# Patient Record
Sex: FEMALE | Race: BLACK OR AFRICAN AMERICAN | Employment: FULL TIME | URBAN - METROPOLITAN AREA
[De-identification: names, ages, dates, MRNs, and addresses within clinical notes are randomized per-mention and may not be internally consistent; named-entity substitution may affect disease eponyms.]

---

## 2018-08-23 PROBLEM — E66.01 OBESITY, MORBID (HCC): Status: ACTIVE | Noted: 2018-08-23

## 2018-09-24 PROBLEM — E55.9 VITAMIN D DEFICIENCY: Status: ACTIVE | Noted: 2018-09-24

## 2018-12-26 ENCOUNTER — HOSPITAL ENCOUNTER (OUTPATIENT)
Dept: GENERAL RADIOLOGY | Age: 28
Discharge: HOME | End: 2018-12-26
Payer: COMMERCIAL

## 2018-12-26 DIAGNOSIS — M41.124 ADOLESCENT IDIOPATHIC SCOLIOSIS OF THORACIC REGION: ICD-10-CM

## 2018-12-26 PROCEDURE — 72072 X-RAY EXAM THORAC SPINE 3VWS: CPT

## 2018-12-26 PROCEDURE — 73562 X-RAY EXAM OF KNEE 3: CPT

## 2018-12-27 PROBLEM — G89.29 CHRONIC MIDLINE LOW BACK PAIN WITH SCIATICA: Status: ACTIVE | Noted: 2018-12-27

## 2018-12-27 PROBLEM — M54.40 CHRONIC MIDLINE LOW BACK PAIN WITH SCIATICA: Status: ACTIVE | Noted: 2018-12-27

## 2020-02-25 ENCOUNTER — HOSPITAL ENCOUNTER (OUTPATIENT)
Dept: MRI IMAGING | Age: 30
Discharge: HOME | End: 2020-02-25
Attending: FAMILY MEDICINE
Payer: COMMERCIAL

## 2020-02-25 DIAGNOSIS — M25.561 CHRONIC PAIN OF RIGHT KNEE: ICD-10-CM

## 2020-02-25 DIAGNOSIS — G89.29 CHRONIC PAIN OF RIGHT KNEE: ICD-10-CM

## 2020-02-25 PROCEDURE — 73721 MRI JNT OF LWR EXTRE W/O DYE: CPT

## 2020-03-06 NOTE — PROGRESS NOTES
She has a partial-thickness tear of the lateral patellar facet with some delamination and edema. She needs to be referred to orthopedics.

## 2020-03-29 ENCOUNTER — APPOINTMENT (OUTPATIENT)
Dept: GENERAL RADIOLOGY | Age: 30
End: 2020-03-29
Attending: EMERGENCY MEDICINE
Payer: COMMERCIAL

## 2020-03-29 ENCOUNTER — HOSPITAL ENCOUNTER (EMERGENCY)
Age: 30
Discharge: HOME | End: 2020-03-30
Attending: EMERGENCY MEDICINE
Payer: COMMERCIAL

## 2020-03-29 VITALS
WEIGHT: 267 LBS | SYSTOLIC BLOOD PRESSURE: 132 MMHG | HEART RATE: 75 BPM | DIASTOLIC BLOOD PRESSURE: 81 MMHG | BODY MASS INDEX: 42.91 KG/M2 | TEMPERATURE: 98.7 F | RESPIRATION RATE: 17 BRPM | OXYGEN SATURATION: 100 % | HEIGHT: 66 IN

## 2020-03-29 DIAGNOSIS — R07.89 MUSCULOSKELETAL CHEST PAIN: Primary | ICD-10-CM

## 2020-03-29 LAB
ALBUMIN SERPL-MCNC: 3.8 G/DL (ref 3.5–5)
ALBUMIN/GLOB SERPL: 0.9 {RATIO} (ref 1.2–3.5)
ALP SERPL-CCNC: 62 U/L (ref 50–136)
ALT SERPL-CCNC: 14 U/L (ref 12–65)
ANION GAP SERPL CALC-SCNC: 8 MMOL/L (ref 7–16)
AST SERPL-CCNC: 10 U/L (ref 15–37)
BASOPHILS # BLD: 0 K/UL (ref 0–0.2)
BASOPHILS NFR BLD: 0 % (ref 0–2)
BILIRUB SERPL-MCNC: 0.3 MG/DL (ref 0.2–1.1)
BUN SERPL-MCNC: 13 MG/DL (ref 6–23)
CALCIUM SERPL-MCNC: 10 MG/DL (ref 8.3–10.4)
CHLORIDE SERPL-SCNC: 106 MMOL/L (ref 98–107)
CO2 SERPL-SCNC: 26 MMOL/L (ref 21–32)
CREAT SERPL-MCNC: 0.79 MG/DL (ref 0.6–1)
DIFFERENTIAL METHOD BLD: ABNORMAL
EOSINOPHIL # BLD: 0.5 K/UL (ref 0–0.8)
EOSINOPHIL NFR BLD: 4 % (ref 0.5–7.8)
ERYTHROCYTE [DISTWIDTH] IN BLOOD BY AUTOMATED COUNT: 16.4 % (ref 11.9–14.6)
GLOBULIN SER CALC-MCNC: 4.1 G/DL (ref 2.3–3.5)
GLUCOSE SERPL-MCNC: 90 MG/DL (ref 65–100)
HCT VFR BLD AUTO: 37.8 % (ref 35.8–46.3)
HGB BLD-MCNC: 12 G/DL (ref 11.7–15.4)
IMM GRANULOCYTES # BLD AUTO: 0 K/UL (ref 0–0.5)
IMM GRANULOCYTES NFR BLD AUTO: 0 % (ref 0–5)
LYMPHOCYTES # BLD: 4.4 K/UL (ref 0.5–4.6)
LYMPHOCYTES NFR BLD: 39 % (ref 13–44)
MCH RBC QN AUTO: 26.9 PG (ref 26.1–32.9)
MCHC RBC AUTO-ENTMCNC: 31.7 G/DL (ref 31.4–35)
MCV RBC AUTO: 84.8 FL (ref 79.6–97.8)
MONOCYTES # BLD: 1.1 K/UL (ref 0.1–1.3)
MONOCYTES NFR BLD: 10 % (ref 4–12)
NEUTS SEG # BLD: 5.1 K/UL (ref 1.7–8.2)
NEUTS SEG NFR BLD: 46 % (ref 43–78)
NRBC # BLD: 0 K/UL (ref 0–0.2)
PLATELET # BLD AUTO: 349 K/UL (ref 150–450)
PMV BLD AUTO: 10.5 FL (ref 9.4–12.3)
POTASSIUM SERPL-SCNC: 3.7 MMOL/L (ref 3.5–5.1)
PROT SERPL-MCNC: 7.9 G/DL (ref 6.3–8.2)
RBC # BLD AUTO: 4.46 M/UL (ref 4.05–5.2)
SODIUM SERPL-SCNC: 140 MMOL/L (ref 136–145)
TROPONIN I SERPL-MCNC: <0.02 NG/ML (ref 0.02–0.05)
WBC # BLD AUTO: 11.1 K/UL (ref 4.3–11.1)

## 2020-03-29 PROCEDURE — 80053 COMPREHEN METABOLIC PANEL: CPT

## 2020-03-29 PROCEDURE — 96374 THER/PROPH/DIAG INJ IV PUSH: CPT

## 2020-03-29 PROCEDURE — 93005 ELECTROCARDIOGRAM TRACING: CPT | Performed by: EMERGENCY MEDICINE

## 2020-03-29 PROCEDURE — 85025 COMPLETE CBC W/AUTO DIFF WBC: CPT

## 2020-03-29 PROCEDURE — 74011250636 HC RX REV CODE- 250/636: Performed by: EMERGENCY MEDICINE

## 2020-03-29 PROCEDURE — 84484 ASSAY OF TROPONIN QUANT: CPT

## 2020-03-29 PROCEDURE — 99284 EMERGENCY DEPT VISIT MOD MDM: CPT

## 2020-03-29 RX ORDER — KETOROLAC TROMETHAMINE 30 MG/ML
30 INJECTION, SOLUTION INTRAMUSCULAR; INTRAVENOUS
Status: COMPLETED | OUTPATIENT
Start: 2020-03-29 | End: 2020-03-29

## 2020-03-29 RX ORDER — MINERAL OIL
180 ENEMA (ML) RECTAL DAILY
Qty: 31 TAB | Refills: 3 | Status: SHIPPED | OUTPATIENT
Start: 2020-03-29

## 2020-03-29 RX ADMIN — KETOROLAC TROMETHAMINE 30 MG: 30 INJECTION, SOLUTION INTRAMUSCULAR at 23:53

## 2020-03-29 NOTE — LETTER
[facility] EMERGENCY DEPT 
[address] 
[phone] Work/School Note Date: 3/29/2020 To Whom It May concern: 
 
Verlyn Mortimer was seen and treated today in the emergency room by the following provider(s): 
No providers found. Verlyn Mortimer may return to work on 03/31/2020 Sincerely, Milly Bernal RN

## 2020-03-30 LAB
ATRIAL RATE: 83 BPM
CALCULATED P AXIS, ECG09: 38 DEGREES
CALCULATED R AXIS, ECG10: 27 DEGREES
CALCULATED T AXIS, ECG11: 48 DEGREES
DIAGNOSIS, 93000: NORMAL
P-R INTERVAL, ECG05: 146 MS
Q-T INTERVAL, ECG07: 342 MS
QRS DURATION, ECG06: 84 MS
QTC CALCULATION (BEZET), ECG08: 401 MS
VENTRICULAR RATE, ECG03: 83 BPM

## 2020-03-30 NOTE — ED PROVIDER NOTES
20-year-old female presents with 3 days of cough, sore throat, runny nose. Rhinorrhea is been mostly clear. Cough is been nonproductive. Today she started developing a sharp tightness to her anterior chest which is been constant all day long. This chest pain worsens with movement, palpation, and with deep breathing. No fevers or chills,   patient is a non-smoker and takes no birth control pills. No history of DVT or PE. No past medical history on file. No past surgical history on file.       Family History:   Problem Relation Age of Onset    Diabetes Maternal Aunt     Diabetes Maternal Grandmother        Social History     Socioeconomic History    Marital status: SINGLE     Spouse name: Not on file    Number of children: Not on file    Years of education: Not on file    Highest education level: Not on file   Occupational History    Not on file   Social Needs    Financial resource strain: Not on file    Food insecurity     Worry: Not on file     Inability: Not on file    Transportation needs     Medical: Not on file     Non-medical: Not on file   Tobacco Use    Smoking status: Never Smoker    Smokeless tobacco: Never Used   Substance and Sexual Activity    Alcohol use: Yes     Comment: Occasionally    Drug use: No    Sexual activity: Yes   Lifestyle    Physical activity     Days per week: Not on file     Minutes per session: Not on file    Stress: Not on file   Relationships    Social connections     Talks on phone: Not on file     Gets together: Not on file     Attends Sabianist service: Not on file     Active member of club or organization: Not on file     Attends meetings of clubs or organizations: Not on file     Relationship status: Not on file    Intimate partner violence     Fear of current or ex partner: Not on file     Emotionally abused: Not on file     Physically abused: Not on file     Forced sexual activity: Not on file   Other Topics Concern    Not on file Social History Narrative    Not on file         ALLERGIES: Patient has no known allergies. Review of Systems   Constitutional: Negative for chills and fever. HENT: Positive for congestion, postnasal drip, rhinorrhea and sore throat. Negative for facial swelling and trouble swallowing. Eyes: Negative for discharge and redness. Respiratory: Positive for cough. Negative for shortness of breath. Cardiovascular: Positive for chest pain. Negative for palpitations and leg swelling. Gastrointestinal: Negative for abdominal pain, diarrhea, nausea and vomiting. Skin: Negative for rash. Neurological: Negative for dizziness and headaches. All other systems reviewed and are negative. Vitals:    03/29/20 2253 03/29/20 2313   BP: 130/86 135/87   Pulse:  82   Resp: 18 17   Temp: 98.7 °F (37.1 °C)    SpO2: 100% 100%   Weight: 121.1 kg (267 lb)    Height: 5' 6\" (1.676 m)             Physical Exam  Vitals signs and nursing note reviewed. Constitutional:       General: She is not in acute distress. Appearance: Normal appearance. She is well-developed. She is not ill-appearing, toxic-appearing or diaphoretic. HENT:      Head: Normocephalic and atraumatic. Nose: Congestion present. No rhinorrhea. Mouth/Throat:      Mouth: Mucous membranes are moist.      Pharynx: Oropharynx is clear. No oropharyngeal exudate or posterior oropharyngeal erythema. Eyes:      General: No scleral icterus. Right eye: No discharge. Left eye: No discharge. Conjunctiva/sclera: Conjunctivae normal.      Pupils: Pupils are equal, round, and reactive to light. Neck:      Musculoskeletal: Normal range of motion and neck supple. Cardiovascular:      Rate and Rhythm: Normal rate and regular rhythm. Heart sounds: Normal heart sounds. No murmur. No gallop. Pulmonary:      Effort: Pulmonary effort is normal. No respiratory distress. Breath sounds: Normal breath sounds.  No wheezing or rales.   Chest:      Chest wall: Tenderness present. Musculoskeletal: Normal range of motion. Skin:     General: Skin is warm and dry. Neurological:      General: No focal deficit present. Mental Status: She is alert and oriented to person, place, and time. Mental status is at baseline. Motor: No abnormal muscle tone. Comments: cni 2-12 grossly   Psychiatric:         Mood and Affect: Mood normal.         Behavior: Behavior normal.          MDM  Number of Diagnoses or Management Options  Musculoskeletal chest pain:   Diagnosis management comments: Medical decision making note:  Seasonal allergies versus viral URI. Afebrile.,  Lungs are clear, sats are 100% so chest x-ray was canceled. Skeletal chest pain with a completely normal EKG. Toradol here, Allegra-D to go and maximize OTC regimen as per AVS  This concludes the \"medical decision making note\" part of this emergency department visit note.            Procedures

## 2020-03-30 NOTE — ED NOTES
I have reviewed discharge instructions with the patient. The patient verbalized understanding. Patient left ED via Discharge Method: ambulatory to Home via pov. Opportunity for questions and clarification provided. Patient given 1 scripts. To continue your aftercare when you leave the hospital, you may receive an automated call from our care team to check in on how you are doing. This is a free service and part of our promise to provide the best care and service to meet your aftercare needs.  If you have questions, or wish to unsubscribe from this service please call [phone]. Thank you for Choosing our [facility] Emergency Department.

## 2020-03-30 NOTE — ED TRIAGE NOTES
Pt arrives ambulatory to triage with c/o allergy like s/s that began Friday. Pt reports yesterday she started having a NP cough. Today she states shes had CP intermittently all day. Denies fevers/chills/body aches.

## 2020-03-30 NOTE — DISCHARGE INSTRUCTIONS
Use afrin nasal spray, one spray to each nostril, every 12 hours, TWICE a day, for THREE days only, (then set it aside for 1 week)  1,200mg mucinex DM every 12 hours for a week. Try using a netti-pot or a elier bottle to rinse out your sinuses  Switch from the [employer] decongestant to the allegra d  Drink plenty of fluids  For the chest wall pain : Alternate 4 ibuprofen every 8 hours with 2 extra-strength tylenol every 6 hours      Patient Education        Musculoskeletal Chest Pain: Care Instructions  Your Care Instructions    Chest pain is not always a sign that something is wrong with your heart or that you have another serious problem. The doctor thinks your chest pain is caused by strained muscles or ligaments, inflamed chest cartilage, or another problem in your chest, rather than by your heart. You may need more tests to find the cause of your chest pain. Follow-up care is a key part of your treatment and safety. Be sure to make and go to all appointments, and call your doctor if you are having problems. It's also a good idea to know your test results and keep a list of the medicines you take. How can you care for yourself at home? · Take pain medicines exactly as directed. ? If the doctor gave you a prescription medicine for pain, take it as prescribed. ? If you are not taking a prescription pain medicine, ask your doctor if you can take an over-the-counter medicine. · Rest and protect the sore area. · Stop, change, or take a break from any activity that may be causing your pain or soreness. · Put ice or a cold pack on the sore area for 10 to 20 minutes at a time. Try to do this every 1 to 2 hours for the next 3 days (when you are awake) or until the swelling goes down. Put a thin cloth between the ice and your skin. · After 2 or 3 days, apply a heating pad set on low or a warm cloth to the area that hurts. Some doctors suggest that you go back and forth between hot and cold.   · Do not wrap or tape your ribs for support. This may cause you to take smaller breaths, which could increase your risk of lung problems. · Mentholated creams such as Bengay or Icy Hot may soothe sore muscles. Follow the instructions on the package. · Follow your doctor's instructions for exercising. · Gentle stretching and massage may help you get better faster. Stretch slowly to the point just before pain begins, and hold the stretch for at least 15 to 30 seconds. Do this 3 or 4 times a day. Stretch just after you have applied heat. · As your pain gets better, slowly return to your normal activities. Any increased pain may be a sign that you need to rest a while longer. When should you call for help? Call 911 anytime you think you may need emergency care. For example, call if:    · You have chest pain or pressure. This may occur with:  ? Sweating. ? Shortness of breath. ? Nausea or vomiting. ? Pain that spreads from the chest to the neck, jaw, or one or both shoulders or arms. ? Dizziness or lightheadedness. ? A fast or uneven pulse. After calling  911, chew 1 adult-strength aspirin. Wait for an ambulance. Do not try to drive yourself.     · You have sudden chest pain and shortness of breath, or you cough up blood.    Call your doctor now or seek immediate medical care if:    · You have any trouble breathing.     · Your chest pain gets worse.     · Your chest pain occurs consistently with exercise and is relieved by rest.    Watch closely for changes in your health, and be sure to contact your doctor if:    · Your chest pain does not get better after 1 week. Where can you learn more? Go to http://clare-melida.info/  Enter V293 in the search box to learn more about \"Musculoskeletal Chest Pain: Care Instructions. \"  Current as of: June 26, 2019Content Version: 12.4  © 9994-5441 Healthwise, Incorporated.   Care instructions adapted under license by [employer] (which disclaims liability or warranty for this information). If you have questions about a medical condition or this instruction, always ask your healthcare professional. [address] any warranty or liability for your use of this information. Patient Education        Saline Nasal Washes: Care Instructions  Your Care Instructions  Saline nasal washes help keep the nasal passages open by washing out thick or dried mucus. This simple remedy can help relieve symptoms of allergies, sinusitis, and colds. It also can make the nose feel more comfortable by keeping the mucous membranes moist. You may notice a little burning sensation in your nose the first few times you use the solution, but this usually gets better in a few days. Follow-up care is a key part of your treatment and safety. Be sure to make and go to all appointments, and call your doctor if you are having problems. It's also a good idea to know your test results and keep a list of the medicines you take. How can you care for yourself at home? · You can buy premixed saline solution in a squeeze bottle or other sinus rinse products at a drugstore. Read and follow the instructions on the label. · You also can make your own saline solution by adding 1 teaspoon of salt and 1 teaspoon of baking soda to 2 cups of distilled water. · If you use a homemade solution, pour a small amount into a clean bowl. Using a rubber bulb syringe, squeeze the syringe and place the tip in the salt water. Pull a small amount of the salt water into the syringe by relaxing your hand. · Sit down with your head tilted slightly back. Do not lie down. Put the tip of the bulb syringe or the squeeze bottle a little way into one of your nostrils. Gently drip or squirt a few drops into the nostril. Repeat with the other nostril. Some sneezing and gagging are normal at first.  · Gently blow your nose. · Wipe the syringe or bottle tip clean after each use.   · Repeat this 2 or 3 times a day.  · Use nasal washes gently if you have nosebleeds often. When should you call for help? Watch closely for changes in your health, and be sure to contact your doctor if:    · You often get nosebleeds.     · You have problems doing the nasal washes. Where can you learn more? Go to http://clare-melida.info/  Enter B784 in the search box to learn more about \"Saline Nasal Washes: Care Instructions. \"  Current as of: July 28, 2019Content Version: 12.4  © 7551-3854 Healthwise, Incorporated. Care instructions adapted under license by [employer] (which disclaims liability or warranty for this information). If you have questions about a medical condition or this instruction, always ask your healthcare professional. [address] any warranty or liability for your use of this information.

## 2020-05-19 ENCOUNTER — HOSPITAL ENCOUNTER (OUTPATIENT)
Dept: PHYSICAL THERAPY | Age: 30
Discharge: HOME | End: 2020-05-19
Payer: COMMERCIAL

## 2020-05-19 PROCEDURE — 97162 PT EVAL MOD COMPLEX 30 MIN: CPT

## 2020-05-19 PROCEDURE — 97035 APP MDLTY 1+ULTRASOUND EA 15: CPT

## 2020-05-19 PROCEDURE — 97110 THERAPEUTIC EXERCISES: CPT

## 2020-05-19 NOTE — THERAPY EVALUATION
Maida Steven  : 1990  Primary: [facility]  Secondary:  [address] at [address], [address] [place] [address] Street  [phone]   [phone]       OUTPATIENT PHYSICAL THERAPY:Initial Assessment 2020    ICD-10: Treatment Diagnosis: M25.561 pain in right knee                 Treatment Diagnosis 2: R26.89 other abnormalities of gait and mobility                 Treatment Diagnosis 3:  Precautions: squatting and excessive standing on hard surfaces  Allergies: Patient has no known allergies. TREATMENT PLAN:  Effective Dates: 2020 TO 2020 (60 days). Frequency/Duration: 2 times a week for 60 Day(s) MEDICAL/REFERRING DIAGNOSIS:  Pain in right knee [M25.561]   DATE OF ONSET:on an doff knee pain for 13 years -worse over the last 7 years   REFERRING PHYSICIAN: Rosalinda Cruz MD MD Orders: Evaluate and Treat  Return MD Appointment: unsure     INITIAL ASSESSMENT:  Ms. Olayinka Poon is a 35 y.o. female presenting to physical therapy with complaints of chronic R knee pain since she was 13years old -knee pain improved but aggravated 7 years ago and has had knee pain ever since -knee pain is 5/10 at rest and as high as 8/10 with standing at her job as a  -knee range is wfl -no swelling noted -hyper mobile R patella -weak R quads and hamstrings -ambulates independently with minimal antalgic gait-noted genu valgus -  -very good candidate for skilled physical therapy to include manual therapy, therapeutic exercises, pain modalities as needed and use of taping and hot/cold modalities  -will push strengthening after taping    PROBLEM LIST (Impacting functional limitations):  1. Decreased Strength  2. Decreased ADL/Functional Activities  3. Decreased Transfer Abilities  4. Decreased Ambulation Ability/Technique  5. Decreased Balance  6. Decreased Activity Tolerance  7.  Decreased Pacing Skills INTERVENTIONS PLANNED: (Treatment may consist of any combination of the following)  1. Cold  2. Gait Training  3. Heat  4. Home Exercise Program (HEP)  5. Manual Therapy  6. Therapeutic Exercise/Strengthening  7. Ultrasound (US)     GOALS: (Goals have been discussed and agreed upon with patient.)  Short-Term Functional Goals: Time Frame: 5/19/2020 to 6/2/2020  1. Patient demonstrates independence with home exercise program without verbal cueing provided by therapist.  2. Knee pain is less than 5/10 at rest and less than 8/10 with standing to progress to DC goal   3. Knee strength in quads is increased by 1/2 grade   4. Independent ambulation with minimal to no antalgic gait   5. Able to stand /walk for at least 90 minutes with no knee pain   Discharge Goals: Time Frame: 5/19/2020 to 7/18/2020  1. Minimal to no knee pain at rest and 2-3/10 after working full day as    2. Knee strength is at least 3+ to 4-/5 to allow increased ADLs   3. Independent ambulation with jazmin antalgic gait to allow walking community distances   4. Consistent use of ice/knee brace while at her work [employer] breaks   5. Minimal issues getting in and out of car/bed   6. LEFS score is improved from 55/80 to 68/80    Outcome Measure: Tool Used: Lower Extremity Functional Scale (LEFS)  Score:  Initial: 55/80 Most Recent: X/80 (Date: -- )   Interpretation of Score: 20 questions each scored on a 5 point scale with 0 representing \"extreme difficulty or unable to perform\" and 4 representing \"no difficulty\". The lower the score, the greater the functional disability. 80/80 represents no disability. Minimal detectable change is 9 points. Medical Necessity:   · Patient is expected to demonstrate progress in strength, balance, coordination and functional technique to increase independence with ADLs and improve safety during walking and transfers. · Skilled intervention continues to be required due to R knee pain and weakness is affecting function and gait .   Reason for Services/Other Comments:  · Patient continues to require modification of therapeutic interventions to increase complexity of exercises. Total Evaluation Duration: 20 minutes    Rehabilitation Potential For Stated Goals: Good  Regarding [address] [place] [address] therapy, I certify that the treatment plan above will be carried out by a therapist or under their direction. Thank you for this referral,  Ross Durand PT     Referring Physician Signature: Katrina Kawasaki, MD              Date                     PAIN/SUBJECTIVE:    Initial: Pain Intensity 1: 5  Pain Location 1: Knee  Pain Orientation 1: Right, Medial, Mid  Pain Intervention(s) 1: Rest, Heat applied, Cold pack Post Session:  3/10 -relief with taping and cold pack -performed exercises well     HISTORY:    History of Injury/Illness (Reason for Referral):  Chronic R knee pain since injuring knee when she was 13years old -reinjured at 26years old and pain ever since   Past Medical History/Comorbidities:   Ms. Padmini Maravilla  has no past medical history on file. Ms. Padmini Maravilla  has no past surgical history on file. Social History/Living Environment:   Home Environment: Private residence  # Steps to Enter: 8  Rails to Enter: Yes  Wheelchair Ramp: No  One/Two Story Residence: One story  Living Alone: No  Support Systems: Family member(s)  Patient Expects to be Discharged to[de-identified] Private residence  Current DME Used/Available at Home: None  Tub or Shower Type: Tub/Shower combination  Prior Level of Function/Work/Activity:  Independent with home ADLs and job duties     Ambulatory/Rehab Services H2 Model Falls Risk Assessment    Risk Factors:       No Risk Factors Identified Ability to Rise from Chair:       (1)  Pushes up, successful in one attempt    Falls Prevention Plan:       No modifications necessary    Total: (5 or greater = High Risk): 1    ©2010 [facility]. [facility] Patent #[id number].  Federal Law prohibits the replication, distribution or use without written permission from [facility]    Current Medications:        Current Outpatient Medications:     fexofenadine (Allegra Allergy) 180 mg tablet, Take 1 Tab by mouth daily. , Disp: 31 Tab, Rfl: 3    azithromycin (ZITHROMAX) 250 mg tablet, Take two tablets today then one tablet daily, Disp: 6 Tab, Rfl: 1    fluticasone propionate (FLONASE) 50 mcg/actuation nasal spray, 2 Sprays by Both Nostrils route daily. , Disp: 1 Bottle, Rfl: 0    ergocalciferol (DRISDOL) 50,000 unit capsule, Take 1 Cap by mouth every seven (7) days. , Disp: 4 Cap, Rfl: 5    Date Last Reviewed:  5/19/2020    Number of Personal Factors/Comorbidities that affect the Plan of Care-patient has a history of chronic knee pain for the last 13 years : 1-2: MODERATE COMPLEXITY    EXAMINATION:    Observation/Orthostatic Postural Assessment:          Patient ambulates with rounded shoulders and forward head on neck posture -minimal antalgic gait with noted genu valgus to both knees-hyper mobile R patella  Palpation:         Tight calf and hamstring musculature-mild medial joint line tenderness-   ROM:        Knee range if wfl     Circumference was 16.25 at both knees at mid patella     Strength: Ankles are 3+ to 4-/5 in R ankle and 4-/5 in L ankle     R quads and hamstrings are 3 to 3+/5     L quads and hamstrings are 3+/5     R hip flexor is 4-/5   L hip flexor is 4/5     Special Tests:         Negative ant/posterior drawer signs/ negative collateral ligament stresses/ negative McMurrays sign for medial meniscal damage /hyper mobile R patella with weakness and instability   Neurological Screen:  No radiating pain or numbness or tingling into lower leg   Mental Status:         Alert and oriented   Sensation:       Intact to light touch    Body Structures Involved:  1. Bones  2. Joints  3. Muscles Body Functions Affected:  1. Sensory/Pain  2. Neuromusculoskeletal  3.  Movement Related Activities and Participation Affected:  1. Learning and Applying Knowledge  2. General Tasks and Demands  3. Mobility  4. Self Care  5.  Domestic Life    Number of elements (examined above) that affect the Plan of Care: 3: MODERATE COMPLEXITY    CLINICAL PRESENTATION:    Presentation: Evolving clinical presentation with changing clinical characteristics: MODERATE COMPLEXITY    CLINICAL DECISION MAKING:    Use of outcome tool(s) and clinical judgement create a POC that gives a-impairment of at least 20% but less than 40%: Questionable prediction of patient's progress: MODERATE COMPLEXITY

## 2020-05-19 NOTE — PROGRESS NOTES
Rommel Benton  : 1990  Primary: [facility]  Secondary:  [address] at [address], [address] [place] [address] Street  [phone]   [phone]      OUTPATIENT PHYSICAL THERAPY: Daily Treatment Note 2020    ICD-10: Treatment Diagnosis: M25.561 pain in right knee                 Treatment Diagnosis 2: R26.89 other abnormalities of gait and mobility   Precautions: squatting and excessive standing on concrete floors   Allergies: Patient has no known allergies. TREATMENT PLAN:  Effective Dates: 2020 TO 2020 (60 days). Frequency/Duration: 2 times a week for 60 Day(s) MEDICAL/REFERRING DIAGNOSIS:  Pain in right knee [M25.561]   DATE OF ONSET: on and off x 13 years /injured at 13year old and reinjured at 26year old per patient   REFERRING PHYSICIAN: Lia Livingston MD MD Orders: Evaluate and Treat   Return MD Appointment: unsure     Pre-treatment Symptoms/Complaints:  My knee pain is 5/10 but it gets worse after standing at my job with pain as high as 8/10     Pain: Initial: Pain Intensity 1: 5  Pain Location 1: Knee  Pain Orientation 1: Right, Medial, Mid  Pain Intervention(s) 1: Rest, Heat applied, Cold pack  Post Session: 3/10-relief with taping and cold pack -performed exercises well -improved gait    Medications Last Reviewed:  2020  Updated Objective Findings:  See evaluation note from today   TREATMENT:   THERAPEUTIC EXERCISE: (28 minutes):  Exercises per grid below to improve mobility, strength, balance and coordination. Required minimal verbal cues to promote proper body alignment and promote proper body posture. Progressed resistance, range, repetitions and complexity of movement as indicated.      Date:  2020 Date:   Date:     Activity/Exercise Parameters Parameters Parameters   Calf stretch 2 x 30 seconds by therapist      Hamstring stretch 2 x 30 seconds with belt      Ankle pumps X 20     Straight leg raise 2 x 10     Hip adduction 2 x 10 with yellow theraball     Gluteal sets X 10     Long arc quads 2 x 10 with 3 lbs      Standing hamstring curls 2 x 10 with 3 lbs      Sit to stand  X 7                          Time spent with patient reviewing proper muscle recruitment and technique with exercises. MANUAL THERAPY: (0 minutes): Soft tissue mobilization was utilized and necessary because of the patient's restricted motion of soft tissue   Medial taping to R knee -[facility] for tracking issues     MODALITIES: (18 minutes): *  Ultrasound was used today secondary to the patient having tightened structures limiting joint motion that require an increase in extensibility. Ultrasound was used today to reduce pain, reduce spasm, reduce joint stiffness and increase muscle flexibility. *  Cold Pack Therapy in order to provide analgesia and reduce inflammation and edema. Ultrasound at 1.5 w/cm2 to R knee while supine x 10 minutes  for inflammation    Cold pack to R knee x 8 minutes while seated after exercises for inflammation    HEP: As above; handouts given to patient for all exercises. Treatment/Session Summary:    · Response to Treatment:  relief with cold pack and taping . · Communication/Consultation:  HEP  · Equipment provided today:  None today  · Recommendations/Intent for next treatment session: Next visit will focus on patella stabilization and LE strengthening .     Total Treatment Billable Duration:  Ultrasound x 10 minutes /exercises x 28 minutes /evaluation x 20 minutes =58 minutes   PT Patient Time In/Time Out  Time In: 0900  Time Out: [address], PT

## 2020-05-21 ENCOUNTER — HOSPITAL ENCOUNTER (OUTPATIENT)
Dept: PHYSICAL THERAPY | Age: 30
Discharge: HOME | End: 2020-05-21
Payer: COMMERCIAL

## 2020-05-21 PROCEDURE — 97035 APP MDLTY 1+ULTRASOUND EA 15: CPT

## 2020-05-21 PROCEDURE — 97140 MANUAL THERAPY 1/> REGIONS: CPT

## 2020-05-21 PROCEDURE — 97110 THERAPEUTIC EXERCISES: CPT

## 2020-05-21 NOTE — PROGRESS NOTES
Jose Boyd  : 1990  Primary: Conchita Benavides [facility]  Secondary:  [address] at [address], [address]  [phone]   [phone]      OUTPATIENT PHYSICAL THERAPY: Daily Treatment Note 2020    ICD-10: Treatment Diagnosis: M25.561 pain in right knee                 Treatment Diagnosis 2: R26.89 other abnormalities of gait and mobility   Precautions: squatting and excessive standing on concrete floors   Allergies: Patient has no known allergies. TREATMENT PLAN:  Effective Dates: 2020 TO 2020 (60 days). Frequency/Duration: 2 times a week for 60 Day(s) MEDICAL/REFERRING DIAGNOSIS:  Pain in right knee [M25.561]   DATE OF ONSET: on and off x 13 years /injured at 13year old and reinjured at 26year old per patient   REFERRING PHYSICIAN: Lin Sandifer, MD MD Orders: Evaluate and Treat   Return MD Appointment: unsure     Pre-treatment Symptoms/Complaints:  Pt. Stated exercises are helping with decreased pain and increased strength and stability. Pain: Initial: Pain Intensity 1: 3  Pain Location 1: Knee  Pain Orientation 1: Lateral, Mid, Right  Pain Intervention(s) 1: Cold pack, Exercise  Post Session: 3/10    Medications Last Reviewed:  2020  Updated Objective Findings:  Pt. demonstrated minimal antalgic gait pattern   TREATMENT:   THERAPEUTIC EXERCISE: (30 minutes):  Exercises per grid below to improve mobility, strength, balance and coordination. Required minimal verbal cues to promote proper body alignment and promote proper body posture. Progressed resistance, range, repetitions and complexity of movement as indicated.      Date:  2020 Date:  20 Date:     Activity/Exercise Parameters Parameters Parameters   Calf stretch 2 x 30 seconds by therapist  4x30 sec hold on incline     Hamstring stretch 2 x 30 seconds with belt  4x30 sec hold with strap    Ankle pumps X 20 X 20 reps     Straight leg raise 2 x 10 2x10 reps 5 sec hold     Hip adduction 2 x 10 with yellow theraball 2x10 reps yellow t-ball 5 sec hold each    Gluteal sets X 10 X 20 reps     Long arc quads 2 x 10 with 3 lbs  2x10 reps no wt.s     Standing hamstring curls 2 x 10 with 3 lbs  ----    Sit to stand  X 7  X 5 reps     Heel slides  Spine x 20 reps     Short arc quads  X 20 reps 5 sec hold     Nu step   X 6 mins level 4       Time spent with patient reviewing proper muscle recruitment and technique with exercises. MANUAL THERAPY: (15 minutes): Soft tissue mobilization was utilized and necessary because of the patient's restricted motion of soft tissue Pt. Received patellar mobs to right knee and kinesio taping with the \"0\" format for stability of right knee with blue tape. MODALITIES: (10 minutes): *  Ultrasound was used today secondary to the patient having tightened structures limiting joint motion that require an increase in extensibility. Ultrasound was used today to reduce spasm and increase muscle flexibility. Ultrasound at 1.5 w/cm2 to R knee while supine x 10 minutes      HEP: As above; handouts given to patient for all exercises. Treatment/Session Summary:    · Response to Treatment:  Pt. was compliant with all exercises and ambulated with less antalgic gait. .  · Communication/Consultation:  HEP  · Equipment provided today:  None today  · Recommendations/Intent for next treatment session: Next visit will focus on patella stabilization and LE strengthening .     Total Treatment Billable Duration:  55 mins   PT Patient Time In/Time Out  Time In: 1000  Time Out: [address] [place], [place]

## 2020-05-26 ENCOUNTER — HOSPITAL ENCOUNTER (OUTPATIENT)
Dept: PHYSICAL THERAPY | Age: 30
Discharge: HOME | End: 2020-05-26
Payer: COMMERCIAL

## 2020-05-26 PROCEDURE — 97035 APP MDLTY 1+ULTRASOUND EA 15: CPT

## 2020-05-26 PROCEDURE — 97110 THERAPEUTIC EXERCISES: CPT

## 2020-05-26 PROCEDURE — 97140 MANUAL THERAPY 1/> REGIONS: CPT

## 2020-05-26 NOTE — PROGRESS NOTES
[facility]  : 1990  Primary: Jorge Brower [facility]  Secondary:  [address] at [facility]  [address] [facility], [place], [address] [place] [address]  [phone]   [phone]      OUTPATIENT PHYSICAL THERAPY: Daily Treatment Note 2020    ICD-10: Treatment Diagnosis: M25.561 pain in right knee                 Treatment Diagnosis 2: R26.89 other abnormalities of gait and mobility   Precautions: squatting and excessive standing on concrete floors   Allergies: Patient has no known allergies. TREATMENT PLAN:  Effective Dates: 2020 TO 2020 (60 days). Frequency/Duration: 2 times a week for 60 Day(s) MEDICAL/REFERRING DIAGNOSIS:  Pain in right knee [M25.561]   DATE OF ONSET: on and off x 13 years /injured at 13year old and reinjured at 26year old per patient   REFERRING PHYSICIAN: Hailey Sun MD MD Orders: Evaluate and Treat   Return MD Appointment: unsure     Pre-treatment Symptoms/Complaints:  Pt. Stated she is using ice regularly at home and at work and she is feeling better -exercises are helping with decreased pain and increased strength and stability. Pain: Initial: Pain Intensity 1: 2  Pain Location 1: Knee  Pain Orientation 1: Right  Pain Intervention(s) 1: Cold pack, Elevation, Exercise  Post Session: 1/10 -preformed exercises well -relief with cold pack and taping    Medications Last Reviewed:  2020  Updated Objective Findings:-improved gait   Pt. demonstrated minimal antalgic gait pattern   TREATMENT:   THERAPEUTIC EXERCISE: (28 minutes):  Exercises per grid below to improve mobility, strength, balance and coordination. Required minimal verbal cues to promote proper body alignment and promote proper body posture. Progressed resistance, range, repetitions and complexity of movement as indicated.      Date:  2020 Date:  20 Date:  20   Activity/Exercise Parameters Parameters Parameters   Calf stretch 2 x 30 seconds by therapist 4x30 sec hold on incline  2 x 30 seconds by therapist    Hamstring stretch 2 x 30 seconds with belt  4x30 sec hold with strap 2 x 30 seconds with belt   Ankle pumps X 20 X 20 reps  X 20   Straight leg raise 2 x 10 2x10 reps 5 sec hold  2 x 10 with 1.5 lb   Hip adduction 2 x 10 with yellow theraball 2x10 reps yellow t-ball 5 sec hold each 2 x 10 with theraball   Gluteal sets X 10 X 20 reps  X 10 with 3 second hold   Long arc quads 2 x 10 with 3 lbs  2x10 reps no wt.s  X 20 with 3 lbs   Seated hamstring curls   X 20 with blue band   Standing hamstring curls 2 x 10 with 3 lbs  ---- X 20 with 3 lbs   Hip abduction   X 20 with 3 lbs   Hip extension   X 20 with 3 lbs   Hip flexion   X 20 with 3 lbs   Weight shifts   2 x 30 seconds    Sit to stand  X 7  X 5 reps  X 10   Heel slides  Spine x 20 reps     Short arc quads  X 20 reps 5 sec hold     Nu step   X 6 mins level 4       Time spent with patient reviewing proper muscle recruitment and technique with exercises. MANUAL THERAPY: (15 minutes): Soft tissue mobilization was utilized and necessary because of the patient's restricted motion of soft tissue      Pt. Received patellar mobs to right knee/soft tissue mobilization to IT band and McConnels taping to R knee for stability of right knee . MODALITIES: (18 minutes): *  Ultrasound was used today secondary to the patient having tightened structures limiting joint motion that require an increase in extensibility. Ultrasound was used today to reduce spasm and increase muscle flexibility. Ultrasound at 1.5 w/cm2 to R knee while supine x 10 minutes for inflammation    Cold pack x 8 minutes to R knee while seated following exercises      HEP: As above; handouts given to patient for all exercises.     Treatment/Session Summary:    · Response to Treatment:-increased endurance for exercises -improved gait and less pain -no swelling and knee is not hot to touch   Pt. was compliant with all exercises and ambulated with less antalgic gait. .  · Communication/Consultation:  HEP  · Equipment provided today:  None today  · Recommendations/Intent for next treatment session: Next visit will focus on patella stabilization and LE strengthening .     Total Treatment Billable Duration:  53 minutes   PT Patient Time In/Time Out  Time In: 0800  Time Out: 0905  Steve Anderson PT

## 2020-05-28 ENCOUNTER — HOSPITAL ENCOUNTER (OUTPATIENT)
Dept: PHYSICAL THERAPY | Age: 30
Discharge: HOME | End: 2020-05-28
Payer: COMMERCIAL

## 2020-05-28 PROCEDURE — 97140 MANUAL THERAPY 1/> REGIONS: CPT

## 2020-05-28 PROCEDURE — 97110 THERAPEUTIC EXERCISES: CPT

## 2020-06-02 ENCOUNTER — HOSPITAL ENCOUNTER (OUTPATIENT)
Dept: PHYSICAL THERAPY | Age: 30
Discharge: HOME | End: 2020-06-02
Payer: COMMERCIAL

## 2020-06-02 PROCEDURE — 97140 MANUAL THERAPY 1/> REGIONS: CPT

## 2020-06-02 PROCEDURE — 97110 THERAPEUTIC EXERCISES: CPT

## 2020-06-02 PROCEDURE — 97035 APP MDLTY 1+ULTRASOUND EA 15: CPT

## 2020-06-02 NOTE — THERAPY DISCHARGE
Nory Saucedo  : 1990  Primary: Salazar Velasquez [facility]  Secondary:  [address] at [address], [place], [address] [place] [address]  [phone]   [phone]       OUTPATIENT PHYSICAL Ching Ram 2020    ICD-10: Treatment Diagnosis: M25.561 pain in right knee                 Treatment Diagnosis 2: R26.89 other abnormalities of gait and mobility                 Treatment Diagnosis 3:  Precautions: squatting and excessive standing on hard surfaces  Allergies: Patient has no known allergies. TREATMENT PLAN:  Effective Dates: 2020 TO 2020 (60 days).     Frequency/Duration: 2 times a week for 60 Day(s) MEDICAL/REFERRING DIAGNOSIS:  Pain in right knee [M25.561]   DATE OF ONSET:on an doff knee pain for 13 years -worse over the last 7 years   REFERRING PHYSICIAN: Hesham Mendoza MD MD Orders: Evaluate and Treat  Return MD Appointment: unsure     INITIAL ASSESSMENT:  Ms. Giancarlo East is a 35 y.o. female presenting to physical therapy with complaints of chronic R knee pain since she was 13years old -knee pain improved but aggravated 7 years ago and has had knee pain ever since -knee pain is 5/10 at rest and as high as 8/10 with standing at her job as a  -knee range is wfl -no swelling noted -hyper mobile R patella -weak R quads and hamstrings -ambulates independently with minimal antalgic gait-noted genu valgus -  -very good candidate for skilled physical therapy to include manual therapy, therapeutic exercises, pain modalities as needed and use of taping and hot/cold modalities  -will push strengthening after taping     Patient was seen for 5 visits of R knee rehab with very good progress-pain level is 0-1/10 -knee range of motion is wfl -no swelling is noted-knee strength is 4-/5 -no radicular issues -independent ambulation with no antalgic gait -independent with home exercise program-performing job duties and using ice during work breaks as needed  -motivated patient -patient to be discharged from therapy at this time as she is going to have L  foot surgery within a week -pleasant lady to work with- DC to home program at this time     PROBLEM LIST (Impacting functional limitations):  1. Decreased Strength  2. Decreased ADL/Functional Activities  3. Decreased Transfer Abilities  4. Decreased Ambulation Ability/Technique  5. Decreased Balance  6. Decreased Activity Tolerance  7. Decreased Pacing Skills INTERVENTIONS PLANNED: (Treatment may consist of any combination of the following)  1. Cold  2. Gait Training  3. Heat  4. Home Exercise Program (HEP)  5. Manual Therapy  6. Therapeutic Exercise/Strengthening  7. Ultrasound (US)     GOALS: (Goals have been discussed and agreed upon with patient.)  Short-Term Functional Goals: Time Frame: 5/19/2020 to 6/2/2020  1. Patient demonstrates independence with home exercise program without verbal cueing provided by therapist.-GOAL MET  2. Knee pain is less than 5/10 at rest and less than 8/10 with standing to progress to DC goal -GOAL MET  3. Knee strength in quads is increased by 1/2 grade -GOAL MET  4. Independent ambulation with minimal to no antalgic gait -GOAL MET  5. Able to stand /walk for at least 90 minutes with no knee pain -GOAL MET  Discharge Goals: Time Frame: 5/19/2020 to 7/18/2020  1. Minimal to no knee pain at rest and 2-3/10 after working full day as  -GOAL MET  2. Knee strength is at least 3+ to 4-/5 to allow increased ADLs -GOAL MET  3. Independent ambulation with jazmin antalgic gait to allow walking community distances -GOAL MET  4. Consistent use of ice/knee brace while at her work [employer] breaks -GOAL MET  5. Minimal issues getting in and out of car/bed -GOAL MET  6. LEFS score is improved from 55/80 to 68/80-ONGOING    Outcome Measure:    Tool Used: Lower Extremity Functional Scale (LEFS)  Score:  Initial: 55/80 Most Recent: X/80 (Date: -- )   Interpretation of Score: 20 questions each scored on a 5 point scale with 0 representing \"extreme difficulty or unable to perform\" and 4 representing \"no difficulty\". The lower the score, the greater the functional disability. 80/80 represents no disability. Minimal detectable change is 9 points. Observation/Orthostatic Postural Assessment:          Patient ambulates with less rounded shoulders and less forward head on neck posture -minimal antalgic gait is resolved   Palpation:         Tight calf and hamstring musculature is decreased -mild medial joint line tenderness is resolved -   ROM:        Knee range if wfl     Circumference was 16.25 at both knees at mid patella     Strength: Ankles are  4-/5 in R ankle and 4-/5 in L ankle     R quads and hamstrings are 4-/5      L quads and hamstrings are 3+ to 4-/5     R hip flexor is 4-/5   L hip flexor is 4/5     Special Tests:         Negative ant/posterior drawer signs/ negative collateral ligament stresses/ negative McMurrays sign for medial meniscal damage /hyper mobile R patella with weakness and instability   Neurological Screen:  No radiating pain or numbness or tingling into lower leg   Mental Status:         Alert and oriented   Sensation:       Intact to light touch       Medical Necessity:   · DC to home exercise program  Reason for Services/Other Comments:  · DC to home program       Rehabilitation Potential For Stated Goals: Good  Regarding [address] [place] [address] therapy, I certify that the treatment plan above will be carried out by a therapist or under their direction.   Thank you for this referral,  Mitchell Silva PT                  PAIN/SUBJECTIVE:

## 2020-06-02 NOTE — PROGRESS NOTES
Jose Martin Carr  : 1990  Primary: Latesha Darby  Secondary:  [address] at [address], [place], [address] [place] [address]  [phone]   [phone]      OUTPATIENT PHYSICAL THERAPY: Daily Treatment Note 2020    ICD-10: Treatment Diagnosis: M25.561 pain in right knee                 Treatment Diagnosis 2: R26.89 other abnormalities of gait and mobility   Precautions: squatting and excessive standing on concrete floors   Allergies: Patient has no known allergies. TREATMENT PLAN:  Effective Dates: 2020 TO 2020 (60 days). Frequency/Duration: 2 times a week for 60 Day(s) MEDICAL/REFERRING DIAGNOSIS:  Pain in right knee [M25.561]   DATE OF ONSET: on and off x 13 years /injured at 13year old and reinjured at 26year old per patient   REFERRING PHYSICIAN: Phoebe Bal MD MD Orders: Evaluate and Treat   Return MD Appointment: unsure     Pre-treatment Symptoms/Complaints:  Pt. Stated her knee feels fine but she will have to stop therapy at this time as she is scheduled for surgery for L foot bunionectomy (advised to continue home exercises ). Pain: Initial: Pain Intensity 1: 1  Pain Location 1: Knee  Pain Orientation 1: Anterior, Right  Pain Intervention(s) 1: Exercise, Cold pack  Post Session: 0-1/10 -preformed exercises well -relief with cold pack and taping -no pain or swelling in knee -independent ambulation with no antalgic gait    Medications Last Reviewed:  2020  Updated Objective Findings: -no swelling or pain complaints- Pt. demonstrated better gait and strength. TREATMENT:   THERAPEUTIC EXERCISE: (30 minutes):  Exercises per grid below to improve mobility, strength, balance and coordination. Required minimal verbal cues to promote proper body alignment and promote proper body posture. Progressed resistance, range, repetitions and complexity of movement as indicated.      Date:  20 Date:  2020 Date:  20 Activity/Exercise Parameters Parameters Parameters   Calf stretch 4 x 30 second hold on incline  2 x 30 seconds by therapist  2 x 30 seconds by therapist    Hamstring stretch 4 x 30 sec hold BLE's  with belt  2x30 sec hold with belt 2 x 30 seconds with belt   Ankle pumps X 20 X 30 reps  X 20   Straight leg raise 2 x 10 2x10 reps 5 sec hold  2 x 10 with 1.5 lb   Hip adduction 2 x 10 with yellow theraball 2x10 reps yellow t-ball 5 sec hold each 2 x 10 with theraball   Gluteal sets X 20 reps 5 sec hold   X 10 with 3 second hold   Long arc quads 2 x 10 with 3 lbs  2x10 reps with 3 lbs   X 20 with 3 lbs   Seated hamstring curls Blue band and 3 lb wts each x 20 reps  2 x 10 reps with blue band X 20 with blue band   Standing hamstring curls 2 x 10 with 3 lbs  2 x 10 with 3 lbs ---- X 20 with 3 lbs   Hip abduction X 20 reps BLE's 3 lb wt. s X 20  with 3 lbs  X 20 with 3 lbs   Hip extension X 20 reps 3lb wt. s  X 20 with 3 lbs X 20 with 3 lbs   Hip flexion X 20 reps 3 lb wt. s  X 20 with 3 lbs X 20 with 3 lbs   Weight shifts -------  2 x 30 seconds    Sit to stand  X10 reps X 10 reps  X 10   Heel slides ------     Short arc quads 3 lb wt. s x 20 reps      Nu step  Level 4 x 10 mins  X 610 minutes at level 3        Time spent with patient reviewing proper muscle recruitment and technique with exercises. MANUAL THERAPY: (15 minutes): Soft tissue mobilization was utilized and necessary because of the patient's restricted motion of soft tissue      Pt. Received patellar mobs to right knee/soft tissue mobilization to IT band and McConnels taping      MODALITIES: (18 minutes):    Ultrasound x 10 minutes to R knee at 1.5 w/cm2 while supine for inflammation    Cold pack x 8 minutes to R knee while supine following exercises    HEP: As above; handouts given to patient for all exercises.     Treatment/Session Summary:    · Response to Treatment: very good progress-no pain and no antalgic gait with walking -independent with home exercises -hold at this time due to upcoming foot surgery -  Pt. continues to show improvements with decreased knee pain, increased strength, and mobility .   · Communication/Consultation:  HEP  · Equipment provided today:  None today  · Recommendations/Intent for next treatment session: DIscontinue knee therapy at this time as patient is to have foot surgery-DC to home program   .    Total Treatment Billable Duration:  55 minutes   PT Patient Time In/Time Out  Time In: 0800  Time Out: 0905  Dorothy Dailey, PT

## 2020-06-04 ENCOUNTER — APPOINTMENT (OUTPATIENT)
Dept: PHYSICAL THERAPY | Age: 30
End: 2020-06-04
Payer: COMMERCIAL

## 2020-06-04 LAB — SARS-COV-2, NAA: NOT DETECTED

## 2020-06-09 ENCOUNTER — APPOINTMENT (OUTPATIENT)
Dept: PHYSICAL THERAPY | Age: 30
End: 2020-06-09
Payer: COMMERCIAL

## 2020-06-10 ENCOUNTER — ANESTHESIA EVENT (OUTPATIENT)
Dept: SURGERY | Age: 30
End: 2020-06-10
Payer: COMMERCIAL

## 2020-06-10 NOTE — ANESTHESIA PREPROCEDURE EVALUATION
Relevant Problems   No relevant active problems       Anesthetic History   No history of anesthetic complications            Review of Systems / Medical History  Patient summary reviewed and pertinent labs reviewed    Pulmonary            Asthma (childhood) : well controlled       Neuro/Psych   Within defined limits           Cardiovascular                  Exercise tolerance: >4 METS     GI/Hepatic/Renal  Within defined limits              Endo/Other        Morbid obesity     Other Findings            Physical Exam    Airway  Mallampati: I  TM Distance: 4 - 6 cm  Neck ROM: normal range of motion   Mouth opening: Normal     Cardiovascular    Rhythm: regular  Rate: normal         Dental         Pulmonary  Breath sounds clear to auscultation               Abdominal         Other Findings            Anesthetic Plan    ASA: 3  Anesthesia type: total IV anesthesia      Post-op pain plan if not by surgeon: peripheral nerve block single    Induction: Intravenous  Anesthetic plan and risks discussed with: Patient

## 2020-06-11 ENCOUNTER — HOSPITAL ENCOUNTER (OUTPATIENT)
Age: 30
Setting detail: OUTPATIENT SURGERY
Discharge: HOME | End: 2020-06-11
Attending: ORTHOPAEDIC SURGERY | Admitting: ORTHOPAEDIC SURGERY
Payer: COMMERCIAL

## 2020-06-11 ENCOUNTER — APPOINTMENT (OUTPATIENT)
Dept: GENERAL RADIOLOGY | Age: 30
End: 2020-06-11
Attending: ORTHOPAEDIC SURGERY
Payer: COMMERCIAL

## 2020-06-11 ENCOUNTER — ANESTHESIA (OUTPATIENT)
Dept: SURGERY | Age: 30
End: 2020-06-11
Payer: COMMERCIAL

## 2020-06-11 ENCOUNTER — APPOINTMENT (OUTPATIENT)
Dept: PHYSICAL THERAPY | Age: 30
End: 2020-06-11
Payer: COMMERCIAL

## 2020-06-11 VITALS
WEIGHT: 283 LBS | BODY MASS INDEX: 45.68 KG/M2 | TEMPERATURE: 98 F | HEART RATE: 75 BPM | RESPIRATION RATE: 16 BRPM | OXYGEN SATURATION: 98 % | DIASTOLIC BLOOD PRESSURE: 67 MMHG | SYSTOLIC BLOOD PRESSURE: 135 MMHG

## 2020-06-11 PROCEDURE — 76010000161 HC OR TIME 1 TO 1.5 HR INTENSV-TIER 1: Performed by: ORTHOPAEDIC SURGERY

## 2020-06-11 PROCEDURE — 77030002933 HC SUT MCRYL J&J -A: Performed by: ORTHOPAEDIC SURGERY

## 2020-06-11 PROCEDURE — 77030018836 HC SOL IRR NACL ICUM -A: Performed by: ORTHOPAEDIC SURGERY

## 2020-06-11 PROCEDURE — C1713 ANCHOR/SCREW BN/BN,TIS/BN: HCPCS | Performed by: ORTHOPAEDIC SURGERY

## 2020-06-11 PROCEDURE — 77030020275 HC MISC ORTHOPEDIC: Performed by: ORTHOPAEDIC SURGERY

## 2020-06-11 PROCEDURE — 77030003602 HC NDL NRV BLK BBMI -B: Performed by: ANESTHESIOLOGY

## 2020-06-11 PROCEDURE — 77030040571: Performed by: ORTHOPAEDIC SURGERY

## 2020-06-11 PROCEDURE — 77030006788 HC BLD SAW OSC STRY -B: Performed by: ORTHOPAEDIC SURGERY

## 2020-06-11 PROCEDURE — 77030002916 HC SUT ETHLN J&J -A: Performed by: ORTHOPAEDIC SURGERY

## 2020-06-11 PROCEDURE — 74011250636 HC RX REV CODE- 250/636: Performed by: NURSE ANESTHETIST, CERTIFIED REGISTERED

## 2020-06-11 PROCEDURE — 74011250636 HC RX REV CODE- 250/636: Performed by: ANESTHESIOLOGY

## 2020-06-11 PROCEDURE — 76010010054 HC POST OP PAIN BLOCK: Performed by: ANESTHESIOLOGY

## 2020-06-11 PROCEDURE — 77030040573 HC BUR PROSTEP MICA WRGH -C: Performed by: ORTHOPAEDIC SURGERY

## 2020-06-11 PROCEDURE — 76210000020 HC REC RM PH II FIRST 0.5 HR: Performed by: ORTHOPAEDIC SURGERY

## 2020-06-11 PROCEDURE — 77030003044 HC WRE K WRGH -B: Performed by: ORTHOPAEDIC SURGERY

## 2020-06-11 PROCEDURE — 76210000063 HC OR PH I REC FIRST 0.5 HR: Performed by: ORTHOPAEDIC SURGERY

## 2020-06-11 PROCEDURE — 74011000250 HC RX REV CODE- 250: Performed by: NURSE ANESTHETIST, CERTIFIED REGISTERED

## 2020-06-11 PROCEDURE — 76942 ECHO GUIDE FOR BIOPSY: CPT | Performed by: ORTHOPAEDIC SURGERY

## 2020-06-11 PROCEDURE — 74011250636 HC RX REV CODE- 250/636: Performed by: ORTHOPAEDIC SURGERY

## 2020-06-11 PROCEDURE — 77030000032 HC CUF TRNQT ZIMM -B: Performed by: ORTHOPAEDIC SURGERY

## 2020-06-11 PROCEDURE — 76060000033 HC ANESTHESIA 1 TO 1.5 HR: Performed by: ORTHOPAEDIC SURGERY

## 2020-06-11 PROCEDURE — 76010010054 HC POST OP PAIN BLOCK: Performed by: ORTHOPAEDIC SURGERY

## 2020-06-11 PROCEDURE — 77030040572 HC DRVR HEX PROSTEP MICA WRGH -C: Performed by: ORTHOPAEDIC SURGERY

## 2020-06-11 PROCEDURE — 77030010509 HC AIRWY LMA MSK TELE -A: Performed by: ANESTHESIOLOGY

## 2020-06-11 DEVICE — SCREW BNE L48MM DIA4MM PROSTEP MICA: Type: IMPLANTABLE DEVICE | Site: FOOT | Status: FUNCTIONAL

## 2020-06-11 DEVICE — SCREW BNE L40MM DIA4MM PROSTEP MICA: Type: IMPLANTABLE DEVICE | Site: FOOT | Status: FUNCTIONAL

## 2020-06-11 DEVICE — HV SCREW
Type: IMPLANTABLE DEVICE | Site: FOOT | Status: FUNCTIONAL
Brand: PROSTEP

## 2020-06-11 RX ORDER — PROPOFOL 10 MG/ML
INJECTION, EMULSION INTRAVENOUS AS NEEDED
Status: DISCONTINUED | OUTPATIENT
Start: 2020-06-11 | End: 2020-06-11 | Stop reason: HOSPADM

## 2020-06-11 RX ORDER — MIDAZOLAM HYDROCHLORIDE 1 MG/ML
2 INJECTION, SOLUTION INTRAMUSCULAR; INTRAVENOUS ONCE
Status: COMPLETED | OUTPATIENT
Start: 2020-06-11 | End: 2020-06-11

## 2020-06-11 RX ORDER — SODIUM CHLORIDE 0.9 % (FLUSH) 0.9 %
5-40 SYRINGE (ML) INJECTION AS NEEDED
Status: DISCONTINUED | OUTPATIENT
Start: 2020-06-11 | End: 2020-06-11 | Stop reason: HOSPADM

## 2020-06-11 RX ORDER — SODIUM CHLORIDE 0.9 % (FLUSH) 0.9 %
5-40 SYRINGE (ML) INJECTION EVERY 8 HOURS
Status: DISCONTINUED | OUTPATIENT
Start: 2020-06-11 | End: 2020-06-11 | Stop reason: HOSPADM

## 2020-06-11 RX ORDER — DEXAMETHASONE SODIUM PHOSPHATE 4 MG/ML
INJECTION, SOLUTION INTRA-ARTICULAR; INTRALESIONAL; INTRAMUSCULAR; INTRAVENOUS; SOFT TISSUE AS NEEDED
Status: DISCONTINUED | OUTPATIENT
Start: 2020-06-11 | End: 2020-06-11 | Stop reason: HOSPADM

## 2020-06-11 RX ORDER — FENTANYL CITRATE 50 UG/ML
INJECTION, SOLUTION INTRAMUSCULAR; INTRAVENOUS AS NEEDED
Status: DISCONTINUED | OUTPATIENT
Start: 2020-06-11 | End: 2020-06-11 | Stop reason: HOSPADM

## 2020-06-11 RX ORDER — KETOROLAC TROMETHAMINE 30 MG/ML
INJECTION, SOLUTION INTRAMUSCULAR; INTRAVENOUS AS NEEDED
Status: DISCONTINUED | OUTPATIENT
Start: 2020-06-11 | End: 2020-06-11 | Stop reason: HOSPADM

## 2020-06-11 RX ORDER — FLUMAZENIL 0.1 MG/ML
0.2 INJECTION INTRAVENOUS
Status: DISCONTINUED | OUTPATIENT
Start: 2020-06-11 | End: 2020-06-11 | Stop reason: HOSPADM

## 2020-06-11 RX ORDER — OXYCODONE HYDROCHLORIDE 5 MG/1
5 TABLET ORAL
Status: DISCONTINUED | OUTPATIENT
Start: 2020-06-11 | End: 2020-06-11 | Stop reason: HOSPADM

## 2020-06-11 RX ORDER — MIDAZOLAM HYDROCHLORIDE 1 MG/ML
2 INJECTION, SOLUTION INTRAMUSCULAR; INTRAVENOUS
Status: DISCONTINUED | OUTPATIENT
Start: 2020-06-11 | End: 2020-06-11 | Stop reason: HOSPADM

## 2020-06-11 RX ORDER — ACETAMINOPHEN 500 MG
1000 TABLET ORAL ONCE
Status: DISCONTINUED | OUTPATIENT
Start: 2020-06-11 | End: 2020-06-11 | Stop reason: HOSPADM

## 2020-06-11 RX ORDER — ONDANSETRON 2 MG/ML
INJECTION INTRAMUSCULAR; INTRAVENOUS AS NEEDED
Status: DISCONTINUED | OUTPATIENT
Start: 2020-06-11 | End: 2020-06-11 | Stop reason: HOSPADM

## 2020-06-11 RX ORDER — HYDROMORPHONE HYDROCHLORIDE 2 MG/ML
0.5 INJECTION, SOLUTION INTRAMUSCULAR; INTRAVENOUS; SUBCUTANEOUS
Status: DISCONTINUED | OUTPATIENT
Start: 2020-06-11 | End: 2020-06-11 | Stop reason: HOSPADM

## 2020-06-11 RX ORDER — LIDOCAINE HYDROCHLORIDE 20 MG/ML
INJECTION, SOLUTION EPIDURAL; INFILTRATION; INTRACAUDAL; PERINEURAL AS NEEDED
Status: DISCONTINUED | OUTPATIENT
Start: 2020-06-11 | End: 2020-06-11 | Stop reason: HOSPADM

## 2020-06-11 RX ORDER — DIPHENHYDRAMINE HYDROCHLORIDE 50 MG/ML
12.5 INJECTION, SOLUTION INTRAMUSCULAR; INTRAVENOUS
Status: DISCONTINUED | OUTPATIENT
Start: 2020-06-11 | End: 2020-06-11 | Stop reason: HOSPADM

## 2020-06-11 RX ORDER — OXYCODONE HYDROCHLORIDE 5 MG/1
10 TABLET ORAL
Status: DISCONTINUED | OUTPATIENT
Start: 2020-06-11 | End: 2020-06-11 | Stop reason: HOSPADM

## 2020-06-11 RX ORDER — FENTANYL CITRATE 50 UG/ML
100 INJECTION, SOLUTION INTRAMUSCULAR; INTRAVENOUS ONCE
Status: COMPLETED | OUTPATIENT
Start: 2020-06-11 | End: 2020-06-11

## 2020-06-11 RX ORDER — CEFAZOLIN SODIUM/WATER 2 G/20 ML
2 SYRINGE (ML) INTRAVENOUS ONCE
Status: DISCONTINUED | OUTPATIENT
Start: 2020-06-11 | End: 2020-06-11

## 2020-06-11 RX ORDER — PROPOFOL 10 MG/ML
INJECTION, EMULSION INTRAVENOUS
Status: DISCONTINUED | OUTPATIENT
Start: 2020-06-11 | End: 2020-06-11 | Stop reason: HOSPADM

## 2020-06-11 RX ORDER — SODIUM CHLORIDE, SODIUM LACTATE, POTASSIUM CHLORIDE, CALCIUM CHLORIDE 600; 310; 30; 20 MG/100ML; MG/100ML; MG/100ML; MG/100ML
100 INJECTION, SOLUTION INTRAVENOUS CONTINUOUS
Status: DISCONTINUED | OUTPATIENT
Start: 2020-06-11 | End: 2020-06-11 | Stop reason: HOSPADM

## 2020-06-11 RX ORDER — ROPIVACAINE HYDROCHLORIDE 5 MG/ML
INJECTION, SOLUTION EPIDURAL; INFILTRATION; PERINEURAL AS NEEDED
Status: DISCONTINUED | OUTPATIENT
Start: 2020-06-11 | End: 2020-06-11 | Stop reason: HOSPADM

## 2020-06-11 RX ORDER — LIDOCAINE HYDROCHLORIDE 10 MG/ML
0.1 INJECTION INFILTRATION; PERINEURAL AS NEEDED
Status: DISCONTINUED | OUTPATIENT
Start: 2020-06-11 | End: 2020-06-11 | Stop reason: HOSPADM

## 2020-06-11 RX ORDER — NALOXONE HYDROCHLORIDE 0.4 MG/ML
0.2 INJECTION, SOLUTION INTRAMUSCULAR; INTRAVENOUS; SUBCUTANEOUS AS NEEDED
Status: DISCONTINUED | OUTPATIENT
Start: 2020-06-11 | End: 2020-06-11 | Stop reason: HOSPADM

## 2020-06-11 RX ADMIN — SODIUM CHLORIDE, SODIUM LACTATE, POTASSIUM CHLORIDE, AND CALCIUM CHLORIDE 100 ML/HR: 600; 310; 30; 20 INJECTION, SOLUTION INTRAVENOUS at 10:12

## 2020-06-11 RX ADMIN — PROPOFOL 160 MCG/KG/MIN: 10 INJECTION, EMULSION INTRAVENOUS at 10:42

## 2020-06-11 RX ADMIN — MIDAZOLAM 2 MG: 1 INJECTION INTRAMUSCULAR; INTRAVENOUS at 10:16

## 2020-06-11 RX ADMIN — PROPOFOL 50 MG: 10 INJECTION, EMULSION INTRAVENOUS at 10:54

## 2020-06-11 RX ADMIN — LIDOCAINE HYDROCHLORIDE 60 MG: 20 INJECTION, SOLUTION EPIDURAL; INFILTRATION; INTRACAUDAL; PERINEURAL at 10:42

## 2020-06-11 RX ADMIN — DEXAMETHASONE SODIUM PHOSPHATE 10 MG: 4 INJECTION, SOLUTION INTRAMUSCULAR; INTRAVENOUS at 11:05

## 2020-06-11 RX ADMIN — ROPIVACAINE HYDROCHLORIDE 30 ML: 150 INJECTION, SOLUTION EPIDURAL; INFILTRATION; PERINEURAL at 10:18

## 2020-06-11 RX ADMIN — KETOROLAC TROMETHAMINE 30 MG: 30 INJECTION, SOLUTION INTRAMUSCULAR; INTRAVENOUS at 11:12

## 2020-06-11 RX ADMIN — FENTANYL CITRATE 100 MCG: 50 INJECTION INTRAMUSCULAR; INTRAVENOUS at 10:15

## 2020-06-11 RX ADMIN — FENTANYL CITRATE 50 MCG: 50 INJECTION INTRAMUSCULAR; INTRAVENOUS at 11:02

## 2020-06-11 RX ADMIN — PROPOFOL 100 MG: 10 INJECTION, EMULSION INTRAVENOUS at 11:00

## 2020-06-11 RX ADMIN — PROPOFOL 50 MG: 10 INJECTION, EMULSION INTRAVENOUS at 10:42

## 2020-06-11 RX ADMIN — FENTANYL CITRATE 50 MCG: 50 INJECTION INTRAMUSCULAR; INTRAVENOUS at 10:57

## 2020-06-11 RX ADMIN — ONDANSETRON 4 MG: 2 INJECTION INTRAMUSCULAR; INTRAVENOUS at 11:09

## 2020-06-11 RX ADMIN — ROPIVACAINE HYDROCHLORIDE 20 ML: 150 INJECTION, SOLUTION EPIDURAL; INFILTRATION; PERINEURAL at 10:21

## 2020-06-11 RX ADMIN — CEFAZOLIN 3 G: 1 INJECTION, POWDER, FOR SOLUTION INTRAVENOUS at 10:46

## 2020-06-11 NOTE — ANESTHESIA PROCEDURE NOTES
Peripheral Block    Start time: 6/11/2020 10:14 AM  End time: 6/11/2020 10:18 AM  Performed by: Yuliet Jean-Baptiste MD  Authorized by: Yuliet Jean-Baptiste MD       Pre-procedure:    Indications: at surgeon's request and post-op pain management    Preanesthetic Checklist: patient identified, risks and benefits discussed, site marked, timeout performed, anesthesia consent given and patient being monitored      Block Type:   Block Type:  Popliteal  Laterality:  Left  Monitoring:  Continuous pulse ox, frequent vital sign checks, heart rate, responsive to questions and oxygen  Injection Technique:  Single shot  Procedures: ultrasound guided    Prep: chlorhexidine    Location:  Mid thigh  Needle Type:  Stimuplex  Needle Gauge:  20 G  Needle Localization:  Anatomical landmarks, infiltration and ultrasound guidance    Assessment:  Number of attempts:  1  Injection Assessment:  Incremental injection every 5 mL, negative aspiration for CSF, local visualized surrounding nerve on ultrasound, negative aspiration for blood, no intravascular symptoms, no paresthesia and ultrasound image on chart  Patient tolerance:  Patient tolerated the procedure well with no immediate complications

## 2020-06-11 NOTE — ANESTHESIA PROCEDURE NOTES
Peripheral Block    Start time: 6/11/2020 10:19 AM  End time: 6/11/2020 10:21 AM  Performed by: Bennett Fuentes MD  Authorized by: Bennett Fuentes MD       Pre-procedure: Indications: at surgeon's request and post-op pain management    Preanesthetic Checklist: patient identified, risks and benefits discussed, site marked, timeout performed, anesthesia consent given and patient being monitored      Block Type:   Block Type:   Adductor canal  Laterality:  Left  Monitoring:  Continuous pulse ox, frequent vital sign checks, heart rate, responsive to questions and oxygen  Injection Technique:  Single shot  Procedures: ultrasound guided    Prep: chlorhexidine    Location:  Mid thigh  Needle Type:  Stimuplex  Needle Gauge:  20 G  Needle Localization:  Anatomical landmarks, infiltration and ultrasound guidance    Assessment:  Number of attempts:  1  Injection Assessment:  Incremental injection every 5 mL, negative aspiration for CSF, local visualized surrounding nerve on ultrasound, negative aspiration for blood, no intravascular symptoms, no paresthesia and ultrasound image on chart  Patient tolerance:  Patient tolerated the procedure well with no immediate complications

## 2020-06-11 NOTE — OP NOTES
FULL OP NOTE    PATIENT NAME: Rommel Benton  MRN: [id number]    DATE OF SURGERY: 6/11/2020    PREOPERATIVE DIAGNOSIS: Acquired hallux valgus of left foot [M20.12]      POSTOPERATIVE DIAGNOSIS: Acquired hallux valgus of left foot [M20.12]  . PROCEDURE: Left minimal invasive double level bunionectomy, 18315    SURGEON: Lena Lynn MD    HARDWARE:   Implant Name Type Inv. Item Serial No.  Lot No. LRB No. Used Action   SCR BNE 4X48MM -- PROSTEP ASHELY - [id number]  SCR BNE 4X48MM -- PROSTEP ASHELY  [facility] [id number] Left 1 Implanted   SCR BNE 4X40MM -- PROSTEP ASHELY - [id number]  SCR BNE 4X40MM -- PROSTEP ASHELY  [employer] [id number] Left 1 Implanted   SCR BNE HALLUX VALGUS 3X22MM -- HV SCREW SYSTEM - [id number]  SCR BNE HALLUX VALGUS 3X22MM -- HV SCREW SYSTEM  [facility] [email or web address] [facility] [id number] Left 1 Implanted     INDICATIONS: This patient is a 27-year-old female with symptomatic left hallux valgus deformity. She is failed conservative therapy desire surgical treatment. Risks and benefits procedure include but not limited to risk of anesthetic complications as well as surgical complications including damage nerves blood vessels, risk of incomplete pain relief, risk of malunion, nonunion, recurrence, need for additional surgery discussed with the patient. She understands the risks and wishes to proceed with surgery at this time. PROCEDURE IN DETAIL: A time out was done to confirm the operating procedure, surgeon, patient and site. A block was placed by department of anesthesia. In a preop surgical timeout the left lower extremity identified correct surgical site prepped draped in standard sterile fashion ChloraPrep solution. A small incision was then made to the medial aspect of the first tarsometatarsal joint.   Guidewires for the cannulated screw system were advanced inside the intramedullary canal the first metatarsal.  Separate small incision was made distally where a "[employer] medical bur was introduced and a osteotomies performed the capital fragment shifted approximately 80% laterally and secured using guidewires. Lag screws were placed over the guidewires a good purchase noted. An Adam osteotomy was performed using a bTendo bur through the proximal phalanx and secured using headless compression screw as well. Wounds were irrigated and closed using Monocryl sutures. Sterile dressings then applied. Anesthesia was discontinued. The patient was transferred back to recovery bed. She was taken recovery in satisfactory condition. She appeared to tolerate procedure well. There were no apparent surgical or anesthetic complications. All needle and sponge counts correct. TOURNIQUET TIME: Approx 36 minutes. SPECIMENS: none    ESTIMATED BLOOD LOSS: min mL.

## 2020-06-11 NOTE — DISCHARGE INSTRUCTIONS
INSTRUCTIONS FOLLOWING FOOT SURGERY    ACTIVITY  Elevate foot. No Ice  Protected partial weight bearing on the heel only as tolerated in post op shoe after full sensation returns. Let the office know if dressing gets saturated with water . DRESSING CARE Keep dry and in place until follow up appointment      DIET  Day of Surgery: Clear liquids until no nausea or vomiting; then light, bland diet (Baked chicken, plain rice, grits, scrambled egg, toast). Nothing Greasy, fried or spicy today  Advance to regular diet on second day, unless your doctor orders otherwise. PAIN  Take pain medications as directed by your doctor. Call your doctor if pain is NOT relieved by medication. PAIN MED SIDE EFFECTS  Constipation: Lots of fluids, try prune juice, then OTC stool softeners if necessary  Nausea: Take medication with food. CALL YOUR DOCTOR IF YOU HAVE  Excessive bleeding that does not stop after holding mild pressure over the area. Temperature of 101 degrees or above. Redness, excessive swelling or bruising, and/or green or yellow, smelly discharge from incision. Loss of sensation - cold, white or blue toes. AFTER ANESTHESIA  For the first 24 hours and while taking narcotics for pain: DO NOT Drive, Drink Alcoholic beverages, or make important Decisions. Be aware of dizziness following anesthesia and while taking pain medication. Preventing Infection at Home  We care about preventing infection and avoiding the spread of germs - not only when you are in the hospital but also when you return home. When you return home from the hospital, its important to take the following steps to help prevent infection and avoid spreading germs that could infect you and others. Ask everyone in your home to follow these guidelines, too. Clean Your Hands  · Clean your hands whenever your hands are visibly dirty, before you eat, before or after touching your mouth, nose or eyes, and before preparing food.  Clean them after contact with body fluids, using the restroom, touching animals or changing diapers. · When washing hands, wet them with warm water and work up a lather. Rub hands for at least 15 seconds, then rinse them and pat them dry with a clean towel or paper towel. · When using hand sanitizers, it should take about 15 seconds to rub your hands dry. If not, you probably didnt apply enough . Cover Your Sneeze or Cough  Germs are released into the air whenever you sneeze or cough. To prevent the spread of infection:  · Turn away from other people before coughing or sneezing. · Cover your mouth or nose with a tissue when you cough or sneeze. Put the tissue in the trash. · If you dont have a tissue, cough or sneeze into your upper sleeve, not your hands. · Always clean your hands after coughing or sneezing. Care for Wounds  Your skin is your bodys first line of defense against germs, but an open wound leaves an easy way for germs to enter your body. To prevent infection:  · Clean your hands before and after changing wound dressings, and wear gloves to change dressings if recommended by your doctor. · Take special care with IV lines or other devices inserted into the body. If you must touch them, clean your hands first.  · Follow any specific instructions from your doctor to care for your wounds. Contact your doctor if you experience any signs of infection, such as fever or increased redness at the surgical or wound site. Keep a Clean Home  · Clean or wipe commonly touched hard surfaces like door handles, sinks, tabletops, phones and TV remotes. · Use products labeled disinfectant to kill harmful bacteria and viruses. · Use a clean cloth or paper towel to clean and dry surfaces. Wiping surfaces with a dirty dishcloth, sponge or towel will only spread germs.   · Never share toothbrushes, piedra, drinking glasses, utensils, razor blades, face cloths or bath towels to avoid spreading germs.  · Be sure that the linens that you sleep on are clean. · Keep pets away from wounds and wash your hands after touching pets, their toys or bedding. We care about you and your health. Remember, preventing infections is a team effort between you, your family, friends and health care providers. DISCHARGE SUMMARY from Nurse    PATIENT INSTRUCTIONS:    After general anesthesia or intravenous sedation, for 24 hours or while taking prescription Narcotics:  · Limit your activities  · Do not drive and operate hazardous machinery  · Do not make important personal or business decisions  · Do  not drink alcoholic beverages  · If you have not urinated within 8 hours after discharge, please contact your surgeon on call. *  Please give a list of your current medications to your Primary Care Provider. *  Please update this list whenever your medications are discontinued, doses are      changed, or new medications (including over-the-counter products) are added. *  Please carry medication information at all times in case of emergency situations. These are general instructions for a healthy lifestyle:    No smoking/ No tobacco products/ Avoid exposure to second hand smoke    Surgeon General's Warning:  Quitting smoking now greatly reduces serious risk to your health. Obesity, smoking, and sedentary lifestyle greatly increases your risk for illness    A healthy diet, regular physical exercise & weight monitoring are important for maintaining a healthy lifestyle    You may be retaining fluid if you have a history of heart failure or if you experience any of the following symptoms:  Weight gain of 3 pounds or more overnight or 5 pounds in a week, increased swelling in our hands or feet or shortness of breath while lying flat in bed. Please call your doctor as soon as you notice any of these symptoms; do not wait until your next office visit.     Recognize signs and symptoms of STROKE:    F-face looks uneven    A-arms unable to move or move unevenly    S-speech slurred or non-existent    T-time-call 911 as soon as signs and symptoms begin-DO NOT go       Back to bed or wait to see if you get better-TIME IS BRAIN. Learning About How to Use Crutches  Your Care Instructions  Crutches can help you walk when you have an injured hip, leg, knee, ankle, or foot. Your doctor will tell you how much weight--if any--you can put on your leg. Be sure your crutches fit you. When you stand up in your normal posture, there should be space for two or three fingers between the top of the crutch and your armpit. When you let your hands hang down, the hand  should be at your wrists. When you put your hands on the hand , your elbows should be slightly bent. To stay safe when using crutches:  · Look straight ahead, not down at your feet. · Clear away small rugs, cords, or anything else that could cause you to trip, slip, or fall. · Be very careful around pets and small children. They can get in your path when you least expect it. · Be sure the rubber tips on your crutches are clean and in good condition to help prevent slipping. · Avoid slick conditions, such as wet floors and snowy or icy driveways. In bad weather, be especially careful on curbs and steps. How to use crutches  Getting ready to walk    1. Bend your elbows slightly. Press the padded top parts of the crutches against your sides, under your armpits. 2. If you have been told not to put any weight on your injured leg, keep that leg bent and off the ground. Walking with crutches    1. Put both crutches about 12 inches in front of you. 2. Put your weight on the handgrips, not on the pads under your arms. (Constant pressure against your underarms can cause numbness.) Swing your body forward. (If you have been told not to put any weight on your injured leg, keep that leg bent and off the ground.)  3.  To complete the step, put your weight on the strong leg.  4. Move your crutches about 12 inches in front of you, and start the next step. 5. When you're confident using the crutches, you can move the crutches and your injured leg at the same time. Then push straight down on the crutches as you step past the crutches with your strong leg, as you would in normal walking. 6. Take small steps. 7. Use ramps and elevators when you can. Sitting down    1. To sit, back up to the chair. Use one hand to hold both crutches by the handgrips, beside your injured leg. With the other hand, hold onto the seat and slowly lower yourself onto the chair. 2. Lay the crutches on the ground near your chair. If you prop them up, they may fall over. Getting up from a chair    1. To get up from a chair,  the crutches and put them in one hand beside your injured leg. 2. Put your weight on the handgrips of the crutches and on your strong leg to stand up. Walking up stairs    1. To go up stairs, step up with your strong leg and then bring the crutches and your injured leg to the upper step. 2. For stairs that have handrails: Put both crutches under the arm opposite the handrail. Use the hand opposite the handrail to hold both crutches by the handgrips. 3. Hold onto the handrail as you go up. Put your strong leg on the step first when you go up. Walking down stairs    1. To go down stairs, put your crutches and injured leg on the lower step. 2. Bring your strong leg to the lower step. This saying may help you remember: \"Up with the good, down with the bad. \"  3. For stairs that have handrails: Put both crutches under the arm opposite the handrail. Use the hand opposite the handrail to hold both crutches by the handgrips. Hold onto the handrail as you go down. Follow the same process you use for stairs: Lead with your crutches and injured leg on the way down. Follow-up care is a key part of your treatment and safety.  Be sure to make and go to all appointments, and call your doctor if you are having problems. It's also a good idea to know your test results and keep a list of the medicines you take. Where can you learn more? Go to http://clare-melida.info/. Enter P436 in the search box to learn more about \"Learning About How to Use Crutches. \"  Current as of: August 4, 2016  Content Version: 11.2  © 7306-0338 [employer]. Care instructions adapted under license by [employer] (which disclaims liability or warranty for this information). If you have questions about a medical condition or this instruction, always ask your healthcare professional. [place] [address] any warranty or liability for your use of this information. Advance Care Planning  People with COVID-19 may have no symptoms, mild symptoms, such as fever, cough, and shortness of breath or they may have more severe illness, developing severe and fatal pneumonia. As a result, Advance Care Planning with attention to naming a health care decision maker (someone you trust to make healthcare decisions for you if you could not speak for yourself) and sharing other health care preferences is important BEFORE a possible health crisis. Please contact your Primary Care Provider to discuss Advance Care Planning. Preventing the Spread of Coronavirus Disease 2019 in Homes and Residential Communities  For the most recent information go to [email or web address]    Prevention steps for People with confirmed or suspected COVID-19 (including persons under investigation) who do not need to be hospitalized  and   People with confirmed COVID-19 who were hospitalized and determined to be medically stable to go home    Your healthcare provider and public health staff will evaluate whether you can be cared for at home.  If it is determined that you do not need to be hospitalized and can be isolated at home, you will be monitored by staff from your local or state health department. You should follow the prevention steps below until a healthcare provider or local or state health department says you can return to your normal activities. Stay home except to get medical care  People who are mildly ill with COVID-19 are able to isolate at home during their illness. You should restrict activities outside your home, except for getting medical care. Do not go to work, school, or public areas. Avoid using public transportation, ride-sharing, or taxis. Separate yourself from other people and animals in your home  People: As much as possible, you should stay in a specific room and away from other people in your home. Also, you should use a separate bathroom, if available. Animals: You should restrict contact with pets and other animals while you are sick with COVID-19, just like you would around other people. Although there have not been reports of pets or other animals becoming sick with COVID-19, it is still recommended that people sick with COVID-19 limit contact with animals until more information is known about the virus. When possible, have another member of your household care for your animals while you are sick. If you are sick with COVID-19, avoid contact with your pet, including petting, snuggling, being kissed or licked, and sharing food. If you must care for your pet or be around animals while you are sick, wash your hands before and after you interact with pets and wear a facemask. Call ahead before visiting your doctor  If you have a medical appointment, call the healthcare provider and tell them that you have or may have COVID-19. This will help the healthcare providers office take steps to keep other people from getting infected or exposed. Wear a facemask  You should wear a facemask when you are around other people (e.g., sharing a room or vehicle) or pets and before you enter a healthcare providers office.  If you are not able to wear a facemask (for example, because it causes trouble breathing), then people who live with you should not stay in the same room with you, or they should wear a facemask if they enter your room. Cover your coughs and sneezes  Cover your mouth and nose with a tissue when you cough or sneeze. Throw used tissues in a lined trash can. Immediately wash your hands with soap and water for at least 20 seconds or, if soap and water are not available, clean your hands with an alcohol-based hand  that contains at least 60% alcohol. Clean your hands often  Wash your hands often with soap and water for at least 20 seconds, especially after blowing your nose, coughing, or sneezing; going to the bathroom; and before eating or preparing food. If soap and water are not readily available, use an alcohol-based hand  with at least 60% alcohol, covering all surfaces of your hands and rubbing them together until they feel dry. Soap and water are the best option if hands are visibly dirty. Avoid touching your eyes, nose, and mouth with unwashed hands. Avoid sharing personal household items  You should not share dishes, drinking glasses, cups, eating utensils, towels, or bedding with other people or pets in your home. After using these items, they should be washed thoroughly with soap and water. Clean all high-touch surfaces everyday  High touch surfaces include counters, tabletops, doorknobs, bathroom fixtures, toilets, phones, keyboards, tablets, and bedside tables. Also, clean any surfaces that may have blood, stool, or body fluids on them. Use a household cleaning spray or wipe, according to the label instructions. Labels contain instructions for safe and effective use of the cleaning product including precautions you should take when applying the product, such as wearing gloves and making sure you have good ventilation during use of the product.   Monitor your symptoms  Seek prompt medical attention if your illness is worsening (e.g., difficulty breathing). Before seeking care, call your healthcare provider and tell them that you have, or are being evaluated for, COVID-19. Put on a facemask before you enter the facility. These steps will help the healthcare providers office to keep other people in the office or waiting room from getting infected or exposed. Ask your healthcare provider to call the local or state health department. Persons who are placed under active monitoring or facilitated self-monitoring should follow instructions provided by their local health department or occupational health professionals, as appropriate. When working with your local health department check their available hours. If you have a medical emergency and need to call 911, notify the dispatch personnel that you have, or are being evaluated for COVID-19. If possible, put on a facemask before emergency medical services arrive. Discontinuing home isolation  Patients with confirmed COVID-19 should remain under home isolation precautions until the risk of secondary transmission to others is thought to be low. The decision to discontinue home isolation precautions should be made on a case-by-case basis, in consultation with healthcare providers and state and local health departments.

## 2020-06-11 NOTE — ANESTHESIA POSTPROCEDURE EVALUATION
Procedure(s):  LEFT MIS BUNIONECTOMY. total IV anesthesia    Anesthesia Post Evaluation      Multimodal analgesia: multimodal analgesia used between 6 hours prior to anesthesia start to PACU discharge  Patient location during evaluation: PACU  Patient participation: complete - patient participated  Level of consciousness: awake and alert  Pain management: adequate  Airway patency: patent  Anesthetic complications: no  Cardiovascular status: acceptable and hemodynamically stable  Respiratory status: acceptable  Hydration status: acceptable        INITIAL Post-op Vital signs:   Vitals Value Taken Time   /85 6/11/2020 12:11 PM   Temp 36.4 °C (97.6 °F) 6/11/2020 11:52 AM   Pulse 74 6/11/2020 12:15 PM   Resp 16 6/11/2020 11:52 AM   SpO2 98 % 6/11/2020 12:15 PM   Vitals shown include unvalidated device data.

## 2020-06-16 ENCOUNTER — APPOINTMENT (OUTPATIENT)
Dept: PHYSICAL THERAPY | Age: 30
End: 2020-06-16
Payer: COMMERCIAL

## 2020-06-23 ENCOUNTER — APPOINTMENT (OUTPATIENT)
Dept: PHYSICAL THERAPY | Age: 30
End: 2020-06-23
Payer: COMMERCIAL

## 2020-06-25 ENCOUNTER — APPOINTMENT (OUTPATIENT)
Dept: PHYSICAL THERAPY | Age: 30
End: 2020-06-25
Payer: COMMERCIAL

## 2020-06-30 ENCOUNTER — APPOINTMENT (OUTPATIENT)
Dept: PHYSICAL THERAPY | Age: 30
End: 2020-06-30
Payer: COMMERCIAL

## 2020-12-18 NOTE — BRIEF OP NOTE
Brief Postoperative Note    Patient: Beryl De Guzman  YOB: 1990  MRN: [id number]    Date of Procedure: 6/11/2020     Pre-Op Diagnosis: Acquired hallux valgus of left foot [M20.12]    Post-Op Diagnosis: Same as preoperative diagnosis. Procedure(s):  LEFT MIS BUNIONECTOMY    Surgeon(s):  Praveen Rajan MD    Surgical Assistant: None    Anesthesia: General     Estimated Blood Loss (mL): Minimal    Complications: None    Specimens: * No specimens in log *     Implants:   Implant Name Type Inv.  Item Serial No.  Lot No. LRB No. Used Action   SCR BNE 4X48MM -- PROSTEP ASHELY - [id number]  SCR BNE 4X48MM -- PROSTEP ASHELY  [facility] [id number] Left 1 Implanted   SCR BNE 4X40MM -- PROSTEP ASHELY - [id number]  SCR BNE 4X40MM -- PROSTEP ASHELY  [facility] [id number] Left 1 Implanted   SCR BNE HALLUX VALGUS 3X22MM -- HV SCREW SYSTEM - [id number]  SCR BNE HALLUX VALGUS 3X22MM -- HV SCREW SYSTEM  [facility] [id number] Left 1 Implanted       Drains: * No LDAs found *    Findings:     Electronically Signed by Minh Yepez MD on 6/11/2020 at 3:38 PM Patient has had epigastric pain and gastrointestinal symptoms for approximately 6 weeks. Her CT abdomen was unremarkable.   I have some concerns about starting her on an anticoagulant with undiagnosed GI symptoms, however she has never had any GI bleeding

## 2021-07-30 ENCOUNTER — HOSPITAL ENCOUNTER (OUTPATIENT)
Dept: GENERAL RADIOLOGY | Age: 31
Discharge: HOME | End: 2021-07-30
Payer: COMMERCIAL

## 2021-07-30 DIAGNOSIS — M25.571 ACUTE RIGHT ANKLE PAIN: ICD-10-CM

## 2021-07-30 PROCEDURE — 73610 X-RAY EXAM OF ANKLE: CPT

## 2022-03-18 PROBLEM — M54.40 CHRONIC MIDLINE LOW BACK PAIN WITH SCIATICA: Status: ACTIVE | Noted: 2018-12-27

## 2022-03-18 PROBLEM — E55.9 VITAMIN D DEFICIENCY: Status: ACTIVE | Noted: 2018-09-24

## 2022-03-18 PROBLEM — G89.29 CHRONIC MIDLINE LOW BACK PAIN WITH SCIATICA: Status: ACTIVE | Noted: 2018-12-27

## 2022-03-19 PROBLEM — E66.01 OBESITY, MORBID (HCC): Status: ACTIVE | Noted: 2018-08-23

## 2022-08-31 DIAGNOSIS — R73.01 IMPAIRED FASTING GLUCOSE: Primary | ICD-10-CM

## 2022-08-31 DIAGNOSIS — R60.1 GENERALIZED EDEMA: ICD-10-CM

## 2022-08-31 DIAGNOSIS — E55.9 VITAMIN D DEFICIENCY: ICD-10-CM

## 2022-08-31 DIAGNOSIS — R73.01 IMPAIRED FASTING GLUCOSE: ICD-10-CM

## 2022-08-31 LAB
25(OH)D3 SERPL-MCNC: 84.1 NG/ML (ref 30–100)
ALBUMIN SERPL-MCNC: 3.8 G/DL (ref 3.5–5)
ALBUMIN/GLOB SERPL: 1.1 {RATIO} (ref 1.2–3.5)
ALP SERPL-CCNC: 88 U/L (ref 50–136)
ALT SERPL-CCNC: 76 U/L (ref 12–65)
ANION GAP SERPL CALC-SCNC: 2 MMOL/L (ref 4–13)
AST SERPL-CCNC: 34 U/L (ref 15–37)
BASOPHILS # BLD: 0.1 K/UL (ref 0–0.2)
BASOPHILS NFR BLD: 1 % (ref 0–2)
BILIRUB SERPL-MCNC: 0.6 MG/DL (ref 0.2–1.1)
BUN SERPL-MCNC: 15 MG/DL (ref 6–23)
CALCIUM SERPL-MCNC: 10.4 MG/DL (ref 8.3–10.4)
CHLORIDE SERPL-SCNC: 110 MMOL/L (ref 101–110)
CO2 SERPL-SCNC: 24 MMOL/L (ref 21–32)
CREAT SERPL-MCNC: 0.8 MG/DL (ref 0.6–1)
DIFFERENTIAL METHOD BLD: ABNORMAL
EOSINOPHIL # BLD: 1.8 K/UL (ref 0–0.8)
EOSINOPHIL NFR BLD: 15 % (ref 0.5–7.8)
ERYTHROCYTE [DISTWIDTH] IN BLOOD BY AUTOMATED COUNT: 15.8 % (ref 11.9–14.6)
EST. AVERAGE GLUCOSE BLD GHB EST-MCNC: 108 MG/DL
GLOBULIN SER CALC-MCNC: 3.6 G/DL (ref 2.3–3.5)
GLUCOSE SERPL-MCNC: 84 MG/DL (ref 65–100)
HBA1C MFR BLD: 5.4 % (ref 4.8–5.6)
HCT VFR BLD AUTO: 39.6 % (ref 35.8–46.3)
HGB BLD-MCNC: 12.3 G/DL (ref 11.7–15.4)
IMM GRANULOCYTES # BLD AUTO: 0.1 K/UL (ref 0–0.5)
IMM GRANULOCYTES NFR BLD AUTO: 1 % (ref 0–5)
LYMPHOCYTES # BLD: 3 K/UL (ref 0.5–4.6)
LYMPHOCYTES NFR BLD: 26 % (ref 13–44)
MCH RBC QN AUTO: 27.4 PG (ref 26.1–32.9)
MCHC RBC AUTO-ENTMCNC: 31.1 G/DL (ref 31.4–35)
MCV RBC AUTO: 88.2 FL (ref 79.6–97.8)
MONOCYTES # BLD: 0.9 K/UL (ref 0.1–1.3)
MONOCYTES NFR BLD: 8 % (ref 4–12)
NEUTS SEG # BLD: 6 K/UL (ref 1.7–8.2)
NEUTS SEG NFR BLD: 51 % (ref 43–78)
NRBC # BLD: 0 K/UL (ref 0–0.2)
PLATELET # BLD AUTO: 334 K/UL (ref 150–450)
PMV BLD AUTO: 11.3 FL (ref 9.4–12.3)
POTASSIUM SERPL-SCNC: 4.1 MMOL/L (ref 3.5–5.1)
PROT SERPL-MCNC: 7.4 G/DL (ref 6.3–8.2)
RBC # BLD AUTO: 4.49 M/UL (ref 4.05–5.2)
SODIUM SERPL-SCNC: 136 MMOL/L (ref 136–145)
TSH, 3RD GENERATION: 0.57 UIU/ML (ref 0.36–3.74)
WBC # BLD AUTO: 11.8 K/UL (ref 4.3–11.1)

## 2022-09-08 ENCOUNTER — TELEPHONE (OUTPATIENT)
Dept: PRIMARY CARE CLINIC | Facility: CLINIC | Age: 32
End: 2022-09-08

## 2022-09-08 ENCOUNTER — TELEMEDICINE (OUTPATIENT)
Dept: PRIMARY CARE CLINIC | Facility: CLINIC | Age: 32
End: 2022-09-08
Payer: COMMERCIAL

## 2022-09-08 DIAGNOSIS — E66.01 OBESITY, MORBID (HCC): ICD-10-CM

## 2022-09-08 DIAGNOSIS — M25.561 CHRONIC PAIN OF BOTH KNEES: Primary | ICD-10-CM

## 2022-09-08 DIAGNOSIS — G89.29 CHRONIC PAIN OF BOTH KNEES: Primary | ICD-10-CM

## 2022-09-08 DIAGNOSIS — E55.9 VITAMIN D DEFICIENCY: ICD-10-CM

## 2022-09-08 DIAGNOSIS — M25.562 CHRONIC PAIN OF BOTH KNEES: Primary | ICD-10-CM

## 2022-09-08 PROCEDURE — 99213 OFFICE O/P EST LOW 20 MIN: CPT | Performed by: FAMILY MEDICINE

## 2022-09-08 NOTE — TELEPHONE ENCOUNTER
Called Cydney to check her out of her appt and schedule her next one in 1 year with labs.  She did not answer and could not leave a vm

## 2022-09-08 NOTE — PROGRESS NOTES
Almas Gerard (:  1990) is a Established patient, here for evaluation of the following:    Assessment & Plan   Below is the assessment and plan developed based on review of pertinent history, physical exam, labs, studies, and medications. 1. Chronic pain of both knees  2. Obesity, morbid ([address]  3. Vitamin D deficiency    Continue meloxicam as needed for knee pain. Encouraged low-carb diet and regular exercise. Vitamin D level is at 87. I have asked her to reduce her dose of vitamin D to 3000 mg rather than 5000 that she is currently taking. We will recheck labs again next year. No follow-ups on file. Subjective   HPI  Here to review labs. Has no physical complaints. Takes meloxicam daily for knee pain. She works as a . Has an active job. Does not routinely exercise. Has had some weight loss. Not on any special diet. No other complaints. Review of Systems       Objective   Patient-Reported Vitals  No data recorded     Physical Exam           Almas Gerard, was evaluated through a synchronous (real-time) audio-video encounter. The patient (or guardian if applicable) is aware that this is a billable service, which includes applicable co-pays. This Virtual Visit was conducted with patient's (and/or legal guardian's) consent. The visit was conducted pursuant to the emergency declaration under the [address] [place] [address], [address] [place] [address] authority and the [employer] Act. Patient identification was verified, and a caregiver was present when appropriate. The patient was located at Home: [address] [place] [id number]. Provider was located at [facility] ([address] [place], [place] [address]t): Desmond Glynn  14..         --Mary Ellen Hewitt MD

## 2022-10-17 RX ORDER — MELOXICAM 15 MG/1
15 TABLET ORAL DAILY PRN
Qty: 30 TABLET | Refills: 0 | Status: SHIPPED | OUTPATIENT
Start: 2022-10-17

## 2022-10-27 ENCOUNTER — E-VISIT (OUTPATIENT)
Dept: PRIMARY CARE CLINIC | Facility: CLINIC | Age: 32
End: 2022-10-27
Payer: COMMERCIAL

## 2022-10-27 DIAGNOSIS — J06.9 ACUTE URI: Primary | ICD-10-CM

## 2022-10-27 PROCEDURE — 99421 OL DIG E/M SVC 5-10 MIN: CPT | Performed by: FAMILY MEDICINE

## 2022-10-27 RX ORDER — AZITHROMYCIN 250 MG/1
250 TABLET, FILM COATED ORAL SEE ADMIN INSTRUCTIONS
Qty: 6 TABLET | Refills: 0 | Status: SHIPPED | OUTPATIENT
Start: 2022-10-27 | End: 2022-11-01

## 2022-10-27 NOTE — PROGRESS NOTES
Champ Lopez (1990) initiated an asynchronous digital communication through [employer]. HPI: per patient questionnaire     Exam: not applicable    Diagnoses and all orders for this visit:  Diagnoses and all orders for this visit:    Acute URI  -     azithromycin (ZITHROMAX) 250 MG tablet; Take 1 tablet by mouth See Admin Instructions for 5 days 500mg on day 1 followed by 250mg on days 2 - 5    Test for covid. Sent in z-pack. Can take mucinex dm for cough and congestion. Call if covid positive.     Time: 10 minutes      Sancho Sepulveda MD

## 2022-11-23 ENCOUNTER — TELEPHONE (OUTPATIENT)
Dept: PRIMARY CARE CLINIC | Facility: CLINIC | Age: 32
End: 2022-11-23

## 2022-11-23 NOTE — TELEPHONE ENCOUNTER
Called Cydney to schedule appt - mailbox full        ----- Message from Elva Sanchez sent at 11/22/2022  9:27 AM EST -----  Subject: Appointment Request    Reason for Call: Established Patient Appointment needed: Routine Existing   Condition Follow Up    QUESTIONS    Reason for appointment request? No appointments available during search     Additional Information for Provider? patient needs an apt for a follow up   in re to her knee - would like to get in sooner than later  ---------------------------------------------------------------------------  --------------  Sadia Montemayor INFO  [phone]; OK to leave message on voicemail  ---------------------------------------------------------------------------  --------------  SCRIPT ANSWERS  COVID Screen: Justen Moody

## 2022-12-15 RX ORDER — MELOXICAM 15 MG/1
15 TABLET ORAL DAILY PRN
Qty: 30 TABLET | Refills: 0 | Status: SHIPPED | OUTPATIENT
Start: 2022-12-15

## 2022-12-16 RX ORDER — MELOXICAM 15 MG/1
15 TABLET ORAL DAILY PRN
Qty: 30 TABLET | Refills: 0 | OUTPATIENT
Start: 2022-12-16

## 2022-12-28 ENCOUNTER — OFFICE VISIT (OUTPATIENT)
Dept: PRIMARY CARE CLINIC | Facility: CLINIC | Age: 32
End: 2022-12-28
Payer: COMMERCIAL

## 2022-12-28 VITALS
TEMPERATURE: 98.4 F | HEIGHT: 66 IN | WEIGHT: 293 LBS | BODY MASS INDEX: 47.09 KG/M2 | DIASTOLIC BLOOD PRESSURE: 76 MMHG | HEART RATE: 81 BPM | SYSTOLIC BLOOD PRESSURE: 126 MMHG | OXYGEN SATURATION: 100 %

## 2022-12-28 DIAGNOSIS — G89.29 CHRONIC PAIN OF RIGHT KNEE: ICD-10-CM

## 2022-12-28 DIAGNOSIS — M25.561 CHRONIC PAIN OF RIGHT KNEE: ICD-10-CM

## 2022-12-28 DIAGNOSIS — M23.206 OLD TEAR OF MENISCUS OF RIGHT KNEE, UNSPECIFIED MENISCUS, UNSPECIFIED TEAR TYPE: Primary | ICD-10-CM

## 2022-12-28 PROCEDURE — 99213 OFFICE O/P EST LOW 20 MIN: CPT | Performed by: FAMILY MEDICINE

## 2022-12-28 RX ORDER — MELOXICAM 15 MG/1
15 TABLET ORAL DAILY PRN
Qty: 30 TABLET | Refills: 5 | Status: SHIPPED | OUTPATIENT
Start: 2022-12-28

## 2022-12-28 ASSESSMENT — PATIENT HEALTH QUESTIONNAIRE - PHQ9
SUM OF ALL RESPONSES TO PHQ QUESTIONS 1-9: 0
2. FEELING DOWN, DEPRESSED OR HOPELESS: 0
1. LITTLE INTEREST OR PLEASURE IN DOING THINGS: 0
SUM OF ALL RESPONSES TO PHQ QUESTIONS 1-9: 0
SUM OF ALL RESPONSES TO PHQ QUESTIONS 1-9: 0
SUM OF ALL RESPONSES TO PHQ9 QUESTIONS 1 & 2: 0
SUM OF ALL RESPONSES TO PHQ QUESTIONS 1-9: 0

## 2022-12-28 NOTE — PROGRESS NOTES
[facility] PRIMARY CARE  Lizzy Barrientos M.D.  [address] [place] [address].  Kanwal Clark  Ph No:  [phone]  Fax:  [phone]:  Chief Complaint   Patient presents with    Knee Pain     Patient presents with right knee since she was 13. She has popped it out of place twice. It swells after she has done a shift at work. She has problems walking and sitting. She has been taking Meloxicam and Tylenol Arthritis strength and Alleve. She ices it. IMPRESSION/PLAN    1. Old tear of meniscus of right knee, unspecified meniscus, unspecified tear type  -     MRI KNEE RIGHT W WO CONTRAST; Future  2. Chronic pain of right knee    I am getting an x-ray of her right knee. May need an MRI and or referral to ortho. We discussed the expected course, resolution and complications of the diagnosis(es) in detail. Medication risks, benefits, costs, interactions, and alternatives were discussed as indicated. I advised pt to contact the office if condition worsens, changes or fails to improve as anticipated. Pt expressed understanding with the diagnosis(es) and plan. HISTORY OF PRESENT ILLNESS:  Patient here with complaints of persistent pain in her right knee. She states that she has had problems with her knee since teenage years at age 13. She has had x-rays in the past that were unrevealing. She states that it pops in and out of place and swells from time to time. She has taken over-the-counter anti-inflammatories and has taken meloxicam which has only helped minimally and temporarily. She states that the pain is getting worse. She is working as a  at [employer] and the longer she is on her feet the worst pain becomes. She has no other complaints. REVIEW OF SYSTEMS:  Review of Systems    Review of systems is as stated above, otherwise is negative.     PHYSICAL EXAM:  Vital Signs - /76 (Site: Left Upper Arm, Position: Sitting, Cuff Size: Large Adult)   Pulse 81   Temp 98.4 °F (36.9 °C) (Temporal)   Ht 5' 6\" (1.676 m)   Wt (!) 309 lb (140.2 kg)   SpO2 100%   BMI 49.87 kg/m²      Physical Exam  Constitutional:       Appearance: She is obese. Musculoskeletal:      Comments: There is no obvious deformity of the knee. There is some laxity of the joint laterally. There is no redness or warmth. Reflexes are intact. Neurological:      Mental Status: She is alert. Jimmy Shine MD            Dictated using voice recognition software.  Proofread, but unrecognized voice recognition errors may exist.

## 2023-01-17 ENCOUNTER — HOSPITAL ENCOUNTER (OUTPATIENT)
Dept: MRI IMAGING | Age: 33
Discharge: HOME | End: 2023-01-20
Payer: COMMERCIAL

## 2023-01-17 DIAGNOSIS — M23.206 OLD TEAR OF MENISCUS OF RIGHT KNEE, UNSPECIFIED MENISCUS, UNSPECIFIED TEAR TYPE: ICD-10-CM

## 2023-01-17 PROCEDURE — 73721 MRI JNT OF LWR EXTRE W/O DYE: CPT

## 2023-02-26 SDOH — ECONOMIC STABILITY: FOOD INSECURITY: WITHIN THE PAST 12 MONTHS, YOU WORRIED THAT YOUR FOOD WOULD RUN OUT BEFORE YOU GOT MONEY TO BUY MORE.: NEVER TRUE

## 2023-02-26 SDOH — ECONOMIC STABILITY: INCOME INSECURITY: HOW HARD IS IT FOR YOU TO PAY FOR THE VERY BASICS LIKE FOOD, HOUSING, MEDICAL CARE, AND HEATING?: NOT HARD AT ALL

## 2023-02-26 SDOH — ECONOMIC STABILITY: FOOD INSECURITY: WITHIN THE PAST 12 MONTHS, THE FOOD YOU BOUGHT JUST DIDN'T LAST AND YOU DIDN'T HAVE MONEY TO GET MORE.: NEVER TRUE

## 2023-02-26 SDOH — ECONOMIC STABILITY: TRANSPORTATION INSECURITY
IN THE PAST 12 MONTHS, HAS LACK OF TRANSPORTATION KEPT YOU FROM MEETINGS, WORK, OR FROM GETTING THINGS NEEDED FOR DAILY LIVING?: NO

## 2023-02-26 SDOH — ECONOMIC STABILITY: HOUSING INSECURITY
IN THE LAST 12 MONTHS, WAS THERE A TIME WHEN YOU DID NOT HAVE A STEADY PLACE TO SLEEP OR SLEPT IN A SHELTER (INCLUDING NOW)?: NO

## 2023-03-01 ENCOUNTER — TELEMEDICINE (OUTPATIENT)
Dept: PRIMARY CARE CLINIC | Facility: CLINIC | Age: 33
End: 2023-03-01
Payer: COMMERCIAL

## 2023-03-01 DIAGNOSIS — M25.561 CHRONIC PAIN OF RIGHT KNEE: Primary | ICD-10-CM

## 2023-03-01 DIAGNOSIS — G89.29 CHRONIC PAIN OF RIGHT KNEE: Primary | ICD-10-CM

## 2023-03-01 PROCEDURE — 99212 OFFICE O/P EST SF 10 MIN: CPT | Performed by: FAMILY MEDICINE

## 2023-03-01 NOTE — PROGRESS NOTES
Gregg Krabbe is a 29 y.o. female evaluated via telephone on 3/1/2023 for   No chief complaint on file. .      Assessment & Plan     1. Chronic pain of right knee  -     [address] am referring her to orthopedic to discuss management options. Advised her to avoid any bending and to wear knee brace for support. Follow-up if no improvement. No follow-ups on file. Subjective   HPI  Here for follow-up and to review the results of her knee MRI. She has some effusion in the knee along with some loose bodies and some inflammation of the tendons. She states the pain comes and goes but is more noticeable when she has been standing at work. Objective   Review of Systems  Patient-Reported Vitals  No data recorded   Physical Exam        Gregg Krabbe, was evaluated through a synchronous (real-time) audio-video encounter. The patient (or guardian if applicable) is aware that this is a billable service, which includes applicable co-pays. This Virtual Visit was conducted with patient's (and/or legal guardian's) consent. The visit was conducted pursuant to the emergency declaration under the [facility], [address] [place] [address] [facility] authority and the [employer] Act. Patient identification was verified, and a caregiver was present when appropriate.    The patient was located at Home: [facility]  Provider was located at  ([address] [place] [address] Dept): [address] [place] [address] 14.           Elo Posey MD

## 2023-03-02 ENCOUNTER — TELEPHONE (OUTPATIENT)
Dept: PRIMARY CARE CLINIC | Facility: CLINIC | Age: 33
End: 2023-03-02

## 2023-03-02 NOTE — TELEPHONE ENCOUNTER
Called Cydney to check her out of her appt and collect her copay.  She did not answer and her vm is full

## 2023-03-13 ENCOUNTER — OFFICE VISIT (OUTPATIENT)
Dept: ORTHOPEDIC SURGERY | Age: 33
End: 2023-03-13
Payer: COMMERCIAL

## 2023-03-13 DIAGNOSIS — M22.2X1 PATELLOFEMORAL PAIN SYNDROME OF RIGHT KNEE: ICD-10-CM

## 2023-03-13 DIAGNOSIS — M17.11 OSTEOARTHRITIS OF RIGHT KNEE, UNSPECIFIED OSTEOARTHRITIS TYPE: Primary | ICD-10-CM

## 2023-03-13 PROCEDURE — 20610 DRAIN/INJ JOINT/BURSA W/O US: CPT | Performed by: PHYSICIAN ASSISTANT

## 2023-03-13 PROCEDURE — 99204 OFFICE O/P NEW MOD 45 MIN: CPT | Performed by: PHYSICIAN ASSISTANT

## 2023-03-13 RX ORDER — METHYLPREDNISOLONE ACETATE 40 MG/ML
80 INJECTION, SUSPENSION INTRA-ARTICULAR; INTRALESIONAL; INTRAMUSCULAR; SOFT TISSUE ONCE
Status: COMPLETED | OUTPATIENT
Start: 2023-03-13 | End: 2023-03-13

## 2023-03-13 RX ADMIN — METHYLPREDNISOLONE ACETATE 80 MG: 40 INJECTION, SUSPENSION INTRA-ARTICULAR; INTRALESIONAL; INTRAMUSCULAR; SOFT TISSUE at 14:17

## 2023-03-13 NOTE — PROGRESS NOTES
Name: Che Perez  YOB: 1990  Gender: female  MRN: [id number]    CC:   Chief Complaint   Patient presents with    Knee Pain     Right knee pain   , Right knee pain and stiffness     HPI:  This patient presents with a chronic history of right knee pain. The patient had a dislocation event initially when she was 13 and then had another incident in her early 25s. Patient notes it still feels out of place. Patient notes pain anteriorly around the patella and some swelling in the knee. Also notes posterior pain. Patient notes clicking. Some numbness in the ankle and foot but not in the knee. Notes this is positional if she sits a certain way for a long time. Increased ambulation aggravates the knee. Notes pain significantly worsened over the last year. Will interfere with sleep. Patient does take meloxicam from PCP. Has had some PT but had to stop because surgery. Nothing recent. No Known Allergies  Past Medical History:   Diagnosis Date    Acquired hallux valgus of left foot     Childhood asthma     Depression     no current meds at this time.     Morbid obesity (HCC)     BMI 43.1    Seasonal allergic rhinitis     managed well with meds     Past Surgical History:   Procedure Laterality Date    BUNIONECTOMY Left     TONSILLECTOMY  as a child     Family History   Problem Relation Age of Onset    Diabetes Maternal Aunt     No Known Problems Mother     Diabetes Maternal Grandmother     No Known Problems Father      Social History     Socioeconomic History    Marital status: Single     Spouse name: Not on file    Number of children: Not on file    Years of education: Not on file    Highest education level: Not on file   Occupational History    Not on file   Tobacco Use    Smoking status: Never    Smokeless tobacco: Never   Substance and Sexual Activity    Alcohol use: Yes    Drug use: No    Sexual activity: Not on file   Other Topics Concern    Not on file   Social History Narrative    Not on file     Social Determinants of Health     Financial Resource Strain: Not on file   Food Insecurity: Not on file   Transportation Needs: Not on file   Physical Activity: Not on file   Stress: Not on file   Social Connections: Not on file   Intimate Partner Violence: Not on file   Housing Stability: Not on file        No flowsheet data found. Review of Systems  Noncontributory   Also noted on the patient medical history form in the chart and are reviewed today. Pertinent positives and negatives are addressed with the patient, particularly those related to musculoskeletal concerns. Non-orthopaedic concerns were referred back to the primary care physician. PE:  General appearance is that of a healthy patient, alert and oriented, NAD. Neck shows no abnormalities. Back and bilateral hips show no significant abnormalities and good ROM. No referral to LE with movement. No TTP to bilateral hips. R and L upper extremities show no significant abnormalities at this time. Both calves are soft. Dorsalis pedis pulses are 2+ and symmetrical.    Motor and sensory exam is intact and equal in both feet. KNEE Right(involved) left   Skin Intact Intact   Atrophy Mild None   Effusion 1+ None   ROM  Full Full   Strength Mild weakness No weakness   Palpation TTP medial and lateral joint , TTP lateral patella facet  No tenderness noted   Patellar Tracking Tracks well    Crepitus 1+ None   Patellar Apprehension Not performed Not tested   Instability None None   Gait Minimally Antalgic      MRI right knee from 1/17/23:  Narrative   MRI KNEE RIGHT WO CONTRAST       Indication: Derangement of unspecified meniscus due to old tear or injury, right   knee       Comparison: February 25, 2020. Technique: Noncontrast multiplanar, multiecho imaging of the right knee was   performed, including T1-weighted and fluid sensitive sequences. FINDINGS:       MEDIAL MENISCUS:  Intact.    LATERAL MENISCUS:  Redemonstrated irregular morphology and increased   intrasubstance signal within the anterior horn of the lateral meniscus, which   may be degenerative. No discrete tear. ACL:  Intact. PCL:  Intact. MCL:  Intact. LATERAL LIGAMENTS AND TENDONS:  Intact. EXTENSOR MECHANISM:  The quadriceps and patellar tendons are intact. Borderline   lateralization of the patellar tendon insertion with a TT-TG distance of 2.0 cm. FAT PADS:   Edema-like signal is noted within the superior lateral aspect of   Hoffa's fat. CARTILAGE:     Patellofemoral compartment:  Diffuse surface irregularity and chondral thinning. Foci of partial-thickness chondral fissuring are noted within the patellar apex   and lateral patellar facet. The trochlear cartilage is intact. Mild   osteophytosis. Medial compartment:  Mild diffuse chondral thinning and surface irregularity   without full-thickness chondral defect. Mild osteophytosis. Lateral compartment: Intact. BONE MARROW: No diffuse marrow signal abnormality. No fracture. Redemonstrated   lesion within the lateral aspect of the distal femoral diaphysis, cortically   based, with sclerotic margin, unchanged and likely representing a fibro-osseous   lesion. OTHER: Moderate joint effusion with multifocal intra-articular loose bodies. ,   Most pronounced within the lateral aspect of the suprapatellar recess. Impression       1. Mild interval progression of chondromalacia patella with multifocal areas of   deep chondral fissuring within the patellar apex and lateral patellar facet. 2.  Focal area of abnormal signal intensity in the superior lateral aspect of   Hoffa's fat pad, suggesting patellar tendon-lateral femoral condyle friction   syndrome.    3.  Borderline lateralization of the patellar tendon insertion with a TT-TG   distance of 2.0 cm.   4.  Similar-appearing irregular morphology and signal within the anterior horn   of the lateral meniscus, which may reflect degenerative changes. 5.  Moderate joint effusion with interval development of numerous small   intra-articular loose bodies. 6.  Unchanged distal femoral metadiaphyseal lesion, most likely representing   chronic nonossifying fibroma. Assessment:     ICD-10-CM    1. Osteoarthritis of right knee, unspecified osteoarthritis type  M17.11 methylPREDNISolone acetate (DEPO-MEDROL) injection 80 mg     DRAIN/INJECT LARGE JOINT/BURSA     Ambulatory referral to Physical Therapy      2. Body mass index (BMI) 45.0-49.9, adult ([facility])  Z68.42       3. Patellofemoral pain syndrome of right knee  M22.2X1 methylPREDNISolone acetate (DEPO-MEDROL) injection 80 mg     DRAIN/INJECT LARGE JOINT/BURSA     Ambulatory referral to Physical Therapy          Plan:  I had a long discussion with the patient regarding the natural history of the problem, as well as treatment options. I reviewed the patient's MRI images with her. I agree that there are some advanced degenerative changes as well as a anterior lateral meniscal tear. Discussed with the patient treatment options including oral medication, injections, therapy and ultimately surgical intervention. At this time she is amenable to proceeding with physical therapy and injection. Follow-up in 6 weeks. Treatment:   Recommend therapy to evaluate and treat current complaints and pathology. A home exercise program was also discussed with the patient. Procedure note:  After discussion of risks and benefits including, but not limited to pain, infection, steroid flare, fat necrosis, skin discoloration, increased blood sugar, and injury to blood vessels or nerves, the patient verbally consented to proceed with injection of the affected knee. We have discussed and they understand that decision to proceed with injection as part of the overall decision to avoid proceeding with major elective surgery. The Right knee(s) was sterilely prepped in standard fashion and injected with 2 cc of  depo medrol(40mg/ml), 2 cc of Naropin (0.5%). The patient tolerated the injection(s) well. The dressing(s) was(were) applied. Return in about 6 weeks (around 4/24/2023).      John, [address]  03/15/23

## 2023-03-21 ENCOUNTER — HOSPITAL ENCOUNTER (OUTPATIENT)
Dept: PHYSICAL THERAPY | Age: 33
Setting detail: RECURRING SERIES
Discharge: HOME | End: 2023-03-24
Payer: COMMERCIAL

## 2023-03-21 PROCEDURE — 97161 PT EVAL LOW COMPLEX 20 MIN: CPT

## 2023-03-21 NOTE — THERAPY EVALUATION
impaired gait, decreased posture, and increased R knee pain. Reason for Services/other comments:  Patient continues to require skilled intervention due to above deficits affecting participation in basic ADLs, work activities, and overall functional tolerance. Total Duration:  Time In: 1657  Time Out: 4263    Regarding Cydney Chester's therapy, I certify that the treatment plan above will be carried out by a therapist or under their direction. Thank you for this referral,  Dejon Ch, PT     Referring Physician Signature:  HENRY Zuñiga _______________________________ Date _____________        Post Session Pain  Charge Capture  PT Visit Info MD Guidelines  MyChart

## 2023-03-21 NOTE — PROGRESS NOTES
verbal, manual, and tactile cues to promote proper body alignment, promote proper body posture, and promote proper body mechanics. Progressed resistance, range, repetitions, and complexity of movement as indicated. Date:  3/21/2023  Date:   Date:     Activity/Exercise Parameters Parameters Parameters                                       Education Treatment, home exercise plan, plan of care, prognosis, pain modulation      [employer] Access Code: --    Time spent with patient reviewing proper muscle recruitment and technique with exercises. MANUAL THERAPY: (0 minutes):   Joint mobilization and Soft tissue mobilization was utilized and necessary because of the patient's restricted joint motion, loss of articular motion, and restricted motion of soft tissue. None today     MODALITIES: (0 minutes):        None today      HEP: As above; handouts given to patient for all exercises. Treatment/Session Summary:    Treatment Assessment:   Next visit will focus on advancements to more challenging activities to include progressing strength, functional mobility, pain tolerance, and ROM as tolerated. Communication/Consultation:   Treatment, home exercise plan, plan of care, prognosis, pain modulation   Equipment provided today: HEP  Recommendations/Intent for next treatment session: Next visit will focus on advancements to more challenging activities to include progressing strength, functional mobility, pain tolerance, and ROM as tolerated.      Total Treatment Billable Duration:  30 minutes low complexity evaluation  Time In: 1657  Time Out: 1730    Tyrone Michelle, PT       Charge Capture  }Post Session Pain  PT Visit Info  [employer] Portal  MD Guidelines  Scanned Media  Benefits  MyChart    Future Appointments   Date Time Provider Ophelia Cottrell   4/24/2023  1:30 PM HENRY Lopez [facility]

## 2023-03-22 ASSESSMENT — PAIN SCALES - GENERAL: PAINLEVEL_OUTOF10: 7

## 2023-03-24 ENCOUNTER — HOSPITAL ENCOUNTER (OUTPATIENT)
Dept: PHYSICAL THERAPY | Age: 33
Setting detail: RECURRING SERIES
Discharge: HOME | End: 2023-03-27
Payer: COMMERCIAL

## 2023-03-24 PROCEDURE — 97110 THERAPEUTIC EXERCISES: CPT

## 2023-03-24 ASSESSMENT — PAIN SCALES - GENERAL: PAINLEVEL_OUTOF10: 5

## 2023-03-24 NOTE — PROGRESS NOTES
cues to promote proper body alignment, promote proper body posture, and promote proper body mechanics. Progressed resistance, range, repetitions, and complexity of movement as indicated. Date:  3/21/2023  Date:  3/24/23 Date:     Activity/Exercise Parameters Parameters Parameters   NuStep  5 minutes, level 4    SLR  3x10, R     Bridges   3x10, yellow ball between knees    Knee Flexion  Machine: 3x10; 33 lbs    Hip Abduction  3x10, each side, standing                Education Treatment, home exercise plan, plan of care, prognosis, pain modulation      [employer] Access Code: --    Time spent with patient reviewing proper muscle recruitment and technique with exercises. MANUAL THERAPY: (0 minutes):   Joint mobilization and Soft tissue mobilization was utilized and necessary because of the patient's restricted joint motion, loss of articular motion, and restricted motion of soft tissue. None today     MODALITIES: (0 minutes):        None today      HEP: As above; handouts given to patient for all exercises. Treatment/Session Summary:    Treatment Assessment:   Patient presents with quad lag during SRL. She reports R knee and low back pain during bridges but was remedied with cueing for  TA recruitment. Patient requires verbal and tactile cueing for form and posture. Patient denies aggravation of symptoms following the session. Communication/Consultation:  None today  Equipment provided today: Updated HEP  Recommendations/Intent for next treatment session: Next visit will focus on advancements to more challenging activities to include progressing strength, functional mobility, pain tolerance, and ROM as tolerated.      Total Treatment Billable Duration:  43 minutes  Time In: 1607  Time Out: 6008 Letty Davila, PT       Charge Capture  }Post Session Pain  PT Visit [facility] Portal  MD Guidelines  Scanned Media  Benefits  MyChart    Future Appointments   Date Time Provider Ophelia Cottrell

## 2023-03-29 ENCOUNTER — HOSPITAL ENCOUNTER (OUTPATIENT)
Dept: PHYSICAL THERAPY | Age: 33
Setting detail: RECURRING SERIES
End: 2023-03-29
Payer: COMMERCIAL

## 2023-03-30 ENCOUNTER — APPOINTMENT (OUTPATIENT)
Dept: PHYSICAL THERAPY | Age: 33
End: 2023-03-30
Payer: COMMERCIAL

## 2023-04-04 ENCOUNTER — HOSPITAL ENCOUNTER (OUTPATIENT)
Dept: PHYSICAL THERAPY | Age: 33
Setting detail: RECURRING SERIES
Discharge: HOME | End: 2023-04-07
Payer: COMMERCIAL

## 2023-04-04 PROCEDURE — 97110 THERAPEUTIC EXERCISES: CPT

## 2023-04-04 ASSESSMENT — PAIN SCALES - GENERAL: PAINLEVEL_OUTOF10: 8

## 2023-04-04 NOTE — PROGRESS NOTES
strength, functional mobility, pain tolerance, and ROM as tolerated.      Total Treatment Billable Duration:  30 minutes  Time In: 1600  Time Out: 1630    KIKE JOHNSON PTA       Charge Capture  }Post Session Pain  PT Visit Info  [employer] Portal  MD Guidelines  Scanned Media  Benefits  MyChart    Future Appointments   Date Time Provider Ophelia Cottrell   4/6/2023  3:30 PM Jaime Burr, PT [facility]   4/14/2023  3:30 PM Jaime Burr, PT [facility]   4/18/2023  3:30 PM Jaime Burr, PT [facility]   4/24/2023  1:30 PM HENRY Kowalski [facility]   4/25/2023  3:30 PM Jaime Burr, PT [facility]   5/2/2023  3:30 PM Jaime Burr, PT [facility]   5/9/2023  3:30 PM Jaime Burr, PT [facility]   5/16/2023  3:30 PM Jaime Burr, PT [facility]

## 2023-04-14 ENCOUNTER — APPOINTMENT (OUTPATIENT)
Dept: PHYSICAL THERAPY | Age: 33
End: 2023-04-14
Payer: COMMERCIAL

## 2023-04-18 ENCOUNTER — APPOINTMENT (OUTPATIENT)
Dept: PHYSICAL THERAPY | Age: 33
End: 2023-04-18
Payer: COMMERCIAL

## 2023-04-18 ENCOUNTER — HOSPITAL ENCOUNTER (OUTPATIENT)
Dept: PHYSICAL THERAPY | Age: 33
Setting detail: RECURRING SERIES
Discharge: HOME | End: 2023-04-21
Payer: COMMERCIAL

## 2023-04-18 PROCEDURE — 97110 THERAPEUTIC EXERCISES: CPT

## 2023-04-18 ASSESSMENT — PAIN SCALES - GENERAL: PAINLEVEL_OUTOF10: 4

## 2023-04-18 NOTE — PROGRESS NOTES
overall functional tolerance. Progress Summary 4/18/2023: Ms. Roberto Gonzalez has attended 1 physical therapy session including initial evaluation as of 4/18/2023. Roberto Gonzalez reports their symptoms have improved by 75% since beginning physical therapy. Patient has made improvements to R knee pain tolerance, tolerance to bending down, picking things up, and walking for longer periods of time with less breaks. Roberto Gonzalez still presents with R knee pain after standing all day at work. Roberto Gonzalez has met 3 out of 8 goals since their initial evaluation. Roberto Gonzalez will continue to benefit from a home exercise program, therapeutic activities, postural strengthening exercises, manual therapeutic techniques, modalities, and pain modulation interventions as appropriate to address their current condition. Roberto Gonzalez will continue to benefit from skilled physical therapy (medically necessary) to address above deficits affecting participation in basic ADLs and overall functional tolerance. Problem List: (Impacting functional limitations): Body Structures, Functions, Activity Limitations Requiring Skilled Therapeutic Intervention: Decreased functional mobility ; Decreased ADL status; Decreased ROM; Decreased body mechanics; Decreased tolerance to work activity; Decreased strength; Decreased endurance; Decreased balance; Increased pain; Decreased posture         PLAN   Effective Dates: 3/21/2023  TO Plan of Care/Certification Expiration Date: 05/21/23     Frequency/Duration: Plan Frequency: 2 sessions per week for 8 weeks     Interventions Planned (Treatment may consist of any combination of the following):    Current Treatment Recommendations: Strengthening; ROM; Balance training; Functional mobility training; Endurance training; Gait training; Stair training; Neuromuscular re-education; Manual; Pain management; Home exercise program; Modalities; Dry needling;  Therapeutic activities     GOALS: (Goals

## 2023-04-18 NOTE — PROGRESS NOTES
Billable Duration:  54 minutes  Time In: 1631  Time Out: [address] [facility] Session Pain  PT Visit Info  [address] Portal  MD Guidelines  Scanned Media  Benefits  MyChart    Future Appointments   Date Time Provider Ophelia Cottrell   4/24/2023  1:30 PM Brenda Lopez [facility]   4/25/2023  4:30 PM Mona Martinez, PT [facility]   5/2/2023  4:30 PM Mona Martinez, PT [facility]   5/9/2023  4:30 PM Mona Martinez, PT [facility]   5/16/2023  4:30 PM [facility]

## 2023-04-24 ENCOUNTER — OFFICE VISIT (OUTPATIENT)
Dept: ORTHOPEDIC SURGERY | Age: 33
End: 2023-04-24
Payer: COMMERCIAL

## 2023-04-24 DIAGNOSIS — M17.11 OSTEOARTHRITIS OF RIGHT KNEE, UNSPECIFIED OSTEOARTHRITIS TYPE: Primary | ICD-10-CM

## 2023-04-24 PROCEDURE — 99214 OFFICE O/P EST MOD 30 MIN: CPT | Performed by: PHYSICIAN ASSISTANT

## 2023-04-25 ENCOUNTER — APPOINTMENT (OUTPATIENT)
Dept: PHYSICAL THERAPY | Age: 33
End: 2023-04-25
Payer: COMMERCIAL

## 2023-04-25 ENCOUNTER — HOSPITAL ENCOUNTER (OUTPATIENT)
Dept: PHYSICAL THERAPY | Age: 33
Setting detail: RECURRING SERIES
Discharge: HOME | End: 2023-04-28
Payer: COMMERCIAL

## 2023-04-25 PROCEDURE — 97110 THERAPEUTIC EXERCISES: CPT

## 2023-04-25 ASSESSMENT — PAIN SCALES - GENERAL: PAINLEVEL_OUTOF10: 4

## 2023-04-25 NOTE — PROGRESS NOTES
Kyrie Braun  : 1990  Primary: [address]  Secondary:  [address] @ [address] [place] [id number]  Phone: [phone]  Fax: [phone] Plan Frequency: 1 session per week for 8 weeks  Plan of Care/Certification Expiration Date: 23      PT Visit Info:  No data recorded   Visit Count:  7   OUTPATIENT PHYSICAL THERAPY:OP NOTE TYPE: Treatment Note 2023       Episode  }Appt Desk             Treatment Diagnosis:  Pain in Right Hip (M25.551)  Stiffness of Right Hip, Not elsewhere classified (M25.651)  Difficulty in walking, Not elsewhere classified (R26.2)  Other abnormalities of gait and mobility (R26.89)  Patellofemoral pain syndrome of right knee [M22.2X1]  Medical/Referring Diagnosis:  Osteoarthritis of right knee, unspecified osteoarthritis type [M17.11]  Patellofemoral pain syndrome of right knee [M22.2X1]  Referring Physician:  HENRY Lancaster MD Orders:  PT Eval and Treat   Date of Onset:  Onset Date: 08 (Chronic R knee pain following a patella subluxation when patient was 13years old)     Allergies:   Patient has no known allergies. Restrictions/Precautions:  No data recorded  No data recorded   Interventions Planned (Treatment may consist of any combination of the following):    Current Treatment Recommendations: Strengthening; ROM; Balance training; Functional mobility training; Endurance training; Gait training; Stair training; Neuromuscular re-education; Manual; Pain management; Home exercise program; Modalities; Dry needling; Therapeutic activities     Subjective Comments:   Patient arrives to therapy feeling the same but she notices that rest helps her pain.    >Initial:     4/10>Post Session:       3/10    Medications Last Reviewed:  2023  Updated Objective Findings:   Antalgic gait due to knee pain  Treatment   THERAPEUTIC EXERCISE: (56 minutes):    Exercises per grid below to improve mobility, strength, and

## 2023-04-27 ENCOUNTER — TELEPHONE (OUTPATIENT)
Dept: ORTHOPEDIC SURGERY | Age: 33
End: 2023-04-27

## 2023-04-28 NOTE — PROGRESS NOTES
Name: Champ Lopez  YOB: 1990  Gender: female  MRN: [id number]    CC:   Chief Complaint   Patient presents with    Follow-up     Right knee 6 week follow up        HPI: Patient is for follow-up of right knee patellofemoral syndrome with underlying DJD. Patient states that the corticosteroid injection only gave her relief for 1 day. She is doing physical therapy for 6 weeks which has helped with stability, but symptoms and pain continued. Denies numbness and tingling. She states she had an allergic reaction to the Kinesiotape. No Known Allergies  Past Medical History:   Diagnosis Date    Acquired hallux valgus of left foot     Childhood asthma     Depression     no current meds at this time. Morbid obesity (HCC)     BMI 43.1    Seasonal allergic rhinitis     managed well with meds     Past Surgical History:   Procedure Laterality Date    BUNIONECTOMY Left     TONSILLECTOMY  as a child     Family History   Problem Relation Age of Onset    Diabetes Maternal Aunt     No Known Problems Mother     Diabetes Maternal Grandmother     No Known Problems Father      Social History     Socioeconomic History    Marital status: Single     Spouse name: Not on file    Number of children: Not on file    Years of education: Not on file    Highest education level: Not on file   Occupational History    Not on file   Tobacco Use    Smoking status: Never    Smokeless tobacco: Never   Substance and Sexual Activity    Alcohol use: Yes    Drug use: No    Sexual activity: Not on file   Other Topics Concern    Not on file   Social History Narrative    Not on file     Social Determinants of Health     Financial Resource Strain: Not on file   Food Insecurity: Not on file   Transportation Needs: Not on file   Physical Activity: Not on file   Stress: Not on file   Social Connections: Not on file   Intimate Partner Violence: Not on file   Housing Stability: Not on file        No flowsheet data found.     Review of

## 2023-05-02 ENCOUNTER — APPOINTMENT (OUTPATIENT)
Dept: PHYSICAL THERAPY | Age: 33
End: 2023-05-02
Payer: COMMERCIAL

## 2023-05-02 ENCOUNTER — HOSPITAL ENCOUNTER (OUTPATIENT)
Dept: PHYSICAL THERAPY | Age: 33
Setting detail: RECURRING SERIES
Discharge: HOME | End: 2023-05-05
Payer: COMMERCIAL

## 2023-05-02 PROCEDURE — 97110 THERAPEUTIC EXERCISES: CPT

## 2023-05-02 ASSESSMENT — PAIN SCALES - GENERAL: PAINLEVEL_OUTOF10: 3

## 2023-05-09 ENCOUNTER — APPOINTMENT (OUTPATIENT)
Dept: PHYSICAL THERAPY | Age: 33
End: 2023-05-09
Payer: COMMERCIAL

## 2023-05-09 ENCOUNTER — HOSPITAL ENCOUNTER (OUTPATIENT)
Dept: PHYSICAL THERAPY | Age: 33
Setting detail: RECURRING SERIES
Discharge: HOME | End: 2023-05-12
Payer: COMMERCIAL

## 2023-05-09 PROCEDURE — 97110 THERAPEUTIC EXERCISES: CPT

## 2023-05-09 ASSESSMENT — PAIN SCALES - GENERAL: PAINLEVEL_OUTOF10: 4

## 2023-05-09 NOTE — PROGRESS NOTES
cueing for plantarflexion. Patient has difficulty coordinating a series of multiple movements. Patient still presents with dynamic knee valgus when performing squat movement pattern. Communication/Consultation:  None today  Equipment provided today: None today  Recommendations/Intent for next treatment session: Next visit will focus on advancements to more challenging activities to include progressing strength, functional mobility, pain tolerance, and ROM as tolerated.      Total Treatment Billable Duration:  56 minutes  Time In: 1630  Time Out: [address], PT       Charge Capture  Post Session Pain  PT Visit Info  [address] Portal  MD Guidelines  Scanned Media  Benefits  MyChart    Future Appointments   Date Time Provider Ophelia Cottrell   5/16/2023  4:30 PM Juliane Mora PT [facility]   5/18/2023  3:00 PM Brenda Mart [facility] AMB   5/25/2023  3:00 PM Brenda Mart [facility] AMB   6/1/2023  3:00 PM HENRY Mart [facility] AMB

## 2023-05-16 ENCOUNTER — HOSPITAL ENCOUNTER (OUTPATIENT)
Dept: PHYSICAL THERAPY | Age: 33
Setting detail: RECURRING SERIES
Discharge: HOME | End: 2023-05-19
Payer: COMMERCIAL

## 2023-05-16 ENCOUNTER — APPOINTMENT (OUTPATIENT)
Dept: PHYSICAL THERAPY | Age: 33
End: 2023-05-16
Payer: COMMERCIAL

## 2023-05-16 PROCEDURE — 97110 THERAPEUTIC EXERCISES: CPT

## 2023-05-16 ASSESSMENT — PAIN SCALES - GENERAL: PAINLEVEL_OUTOF10: 3

## 2023-05-16 NOTE — PROGRESS NOTES
at this time.       Total Treatment Billable Duration:  56 minutes  Time In: 7382  Time Out: [address], PT       Charge Capture  Post Session Pain  PT Visit Info  [address] Portal  MD Guidelines  Scanned Media  Benefits  MyChart    Future Appointments   Date Time Provider Ophelia Cottrell   5/18/2023  3:00 PM Deven Lopezma [facility] AMB   5/25/2023  3:00 PM John Alabama [facility] AMB   6/1/2023  3:00 PM John Alabama PO[facility] AMB   8/21/2023  9:45 AM POW LAB POW [facility] AMB   8/28/2023  2:30 PM Lizzy Finn MD POW [facility] AMB

## 2023-05-16 NOTE — THERAPY DISCHARGE
Amy Barrett  : 1990  Primary: Reagan Valadez ([facility])  Secondary:  [address] @ [address] [place] [address]  Phone: [phone]  Fax: [phone] Plan Frequency: Patient to be discharged at this time  Plan of Care/Certification Expiration Date: 23      PT Visit Info:  Plan Frequency: Patient to be discharged at this time  Plan of Care/Certification Expiration Date: 23      Visit Count:  10                OUTPATIENT PHYSICAL THERAPY:             OP NOTE TYPE: Discharge Summary 2023               Episode (R Knee Pain) Appt Desk         Treatment Diagnosis:  Pain in Right Hip (M25.551)  Stiffness of Right Hip, Not elsewhere classified (M25.651)  Difficulty in walking, Not elsewhere classified (R26.2)  Other abnormalities of gait and mobility (R26.89)  Patellofemoral pain syndrome of right knee [M22.2X1]  Medical/Referring Diagnosis:  Osteoarthritis of right knee, unspecified osteoarthritis type [M17.11]  Patellofemoral pain syndrome of right knee [M22.2X1]  Referring Physician:  HENRY Lema MD Orders:  PT Eval and Treat   Return MD Appt:  23  Date of Onset:  Onset Date: 08 (Chronic R knee pain following a patella subluxation when patient was 13years old)      Allergies:  Patient has no known allergies.   Restrictions/Precautions:           Medications Last Reviewed:  2023           OBJECTIVE         ROM Date:  2023   KNEE ROM (TESTED IN SUPINE) Active    RIGHT LEFT   Flexion 131 from 134° 137°   Extension 0 from -3° +3°         STRENGTH   Date: 2023     Right Left   Hip Abduction 4 4   Hip Extension 4- from 4- 4- from 4-   Hip Flexion 5 from 4+ 5 from 4+   Knee Extension 5 5   Knee Flexion 4+ from 4 4+ from 4+   Ankle DF 5 from 4+ 5 from 4+   Ankle PF 5 from 4+ 5 from 5     FUNCTIONAL MOBILITY   Date:   2023   Gait deviations Still walks with antalgic gait   Assistive device Wears R knee

## 2023-05-18 ENCOUNTER — OFFICE VISIT (OUTPATIENT)
Dept: ORTHOPEDIC SURGERY | Age: 33
End: 2023-05-18
Payer: COMMERCIAL

## 2023-05-18 DIAGNOSIS — M22.2X1 PATELLOFEMORAL PAIN SYNDROME OF RIGHT KNEE: ICD-10-CM

## 2023-05-18 DIAGNOSIS — M17.11 OSTEOARTHRITIS OF RIGHT KNEE, UNSPECIFIED OSTEOARTHRITIS TYPE: Primary | ICD-10-CM

## 2023-05-18 PROCEDURE — 20610 DRAIN/INJ JOINT/BURSA W/O US: CPT | Performed by: PHYSICIAN ASSISTANT

## 2023-05-18 RX ORDER — HYALURONATE SODIUM 10 MG/ML
20 SYRINGE (ML) INTRAARTICULAR ONCE
Status: COMPLETED | OUTPATIENT
Start: 2023-05-18 | End: 2023-05-18

## 2023-05-18 RX ADMIN — Medication 20 MG: at 15:19

## 2023-05-19 NOTE — PROGRESS NOTES
Name: Hussein Perez  YOB: 1990  Gender: female  MRN: [id number]    CC:   Chief Complaint   Patient presents with    Knee Pain     Right knee euflexxa #1   Right knee pain, OA    Patient is here for #1 of 3 visco injections. All questions answered. PROCEDURE NOTE:  After discussion of risks and benefits including but not limited to pain, infection, skin discoloration, and injury to blood vessels or nerves the patient verbally consented to proceed with injection of the affected knee. The affected right knee was sterilely prepped in standard fashion and injected with 2 cc's of euflexxa  . The patient tolerated the injection well. Will follow up as scheduled. Disposition: The patient is to restrict their activity for 48 hours. Review of Systems  NC      ICD-10-CM    1. Osteoarthritis of right knee, unspecified osteoarthritis type  M17.11 DRAIN/INJECT LARGE JOINT/BURSA     sodium hyaluronate (EUFLEXXA, HYALGAN) injection 20 mg      2.  Patellofemoral pain syndrome of right knee  M22.2X1 DRAIN/INJECT LARGE JOINT/BURSA     sodium hyaluronate (EUFLEXXA, HYALGAN) injection 20 mg           Brenda Lopez  05/19/23

## 2023-05-25 ENCOUNTER — OFFICE VISIT (OUTPATIENT)
Dept: ORTHOPEDIC SURGERY | Age: 33
End: 2023-05-25

## 2023-05-25 DIAGNOSIS — M22.2X1 PATELLOFEMORAL PAIN SYNDROME OF RIGHT KNEE: ICD-10-CM

## 2023-05-25 DIAGNOSIS — M17.11 OSTEOARTHRITIS OF RIGHT KNEE, UNSPECIFIED OSTEOARTHRITIS TYPE: Primary | ICD-10-CM

## 2023-05-25 RX ORDER — HYALURONATE SODIUM 10 MG/ML
20 SYRINGE (ML) INTRAARTICULAR ONCE
Status: COMPLETED | OUTPATIENT
Start: 2023-05-25 | End: 2023-05-25

## 2023-05-25 RX ADMIN — Medication 20 MG: at 15:09

## 2023-05-27 NOTE — PROGRESS NOTES
Name: Ernie Locke  YOB: 1990  Gender: female  MRN: [id number]    CC:   Chief Complaint   Patient presents with    Knee Pain     Right knee Euflexxa #2   Right knee pain, OA    Patient is here for #2 of 3 visco injections. All questions answered. PROCEDURE NOTE:  After discussion of risks and benefits including but not limited to pain, infection, skin discoloration, and injury to blood vessels or nerves the patient verbally consented to proceed with injection of the affected knee. The affected right knee was sterilely prepped in standard fashion and injected with 2 cc's of euflexxa. The patient tolerated the injection well. Will follow up as scheduled. Disposition: The patient is to restrict their activity for 48 hours. Review of Systems  NC      ICD-10-CM    1. Osteoarthritis of right knee, unspecified osteoarthritis type  M17.11 sodium hyaluronate (EUFLEXXA, HYALGAN) injection 20 mg     DRAIN/INJECT LARGE JOINT/BURSA      2.  Patellofemoral pain syndrome of right knee  M22.2X1 sodium hyaluronate (EUFLEXXA, HYALGAN) injection 20 mg     DRAIN/INJECT LARGE JOINT/BURSA           Brenda Lopez  05/27/23

## 2023-06-01 ENCOUNTER — OFFICE VISIT (OUTPATIENT)
Dept: ORTHOPEDIC SURGERY | Age: 33
End: 2023-06-01
Payer: COMMERCIAL

## 2023-06-01 DIAGNOSIS — M17.11 OSTEOARTHRITIS OF RIGHT KNEE, UNSPECIFIED OSTEOARTHRITIS TYPE: Primary | ICD-10-CM

## 2023-06-01 DIAGNOSIS — M22.2X1 PATELLOFEMORAL PAIN SYNDROME OF RIGHT KNEE: ICD-10-CM

## 2023-06-01 PROCEDURE — 20610 DRAIN/INJ JOINT/BURSA W/O US: CPT | Performed by: PHYSICIAN ASSISTANT

## 2023-06-01 RX ORDER — HYALURONATE SODIUM 10 MG/ML
20 SYRINGE (ML) INTRAARTICULAR ONCE
Status: COMPLETED | OUTPATIENT
Start: 2023-06-01 | End: 2023-06-01

## 2023-06-01 RX ADMIN — Medication 20 MG: at 15:47

## 2023-06-01 NOTE — PROGRESS NOTES
Name: Amy Darnell  YOB: 1990  Gender: female  MRN: [id number]    CC:   Chief Complaint   Patient presents with    Knee Pain     Right knee euflexxa #3   Right knee pain, OA    Patient is here for #3 of 3 visco injections. All questions answered. PROCEDURE NOTE:  After discussion of risks and benefits including but not limited to pain, infection, skin discoloration, and injury to blood vessels or nerves the patient verbally consented to proceed with injection of the affected knee. The affected right knee was sterilely prepped in standard fashion and injected with 2 cc's of euflexxa. The patient tolerated the injection well. Will follow up as scheduled. Disposition: The patient is to restrict their activity for 48 hours. Review of Systems  NC      ICD-10-CM    1. Osteoarthritis of right knee, unspecified osteoarthritis type  M17.11 DRAIN/INJECT LARGE JOINT/BURSA     sodium hyaluronate (EUFLEXXA, HYALGAN) injection 20 mg      2.  Patellofemoral pain syndrome of right knee  M22.2X1 DRAIN/INJECT LARGE JOINT/BURSA     sodium hyaluronate (EUFLEXXA, HYALGAN) injection 20 mg           Brenda Lopez  06/01/23

## 2023-08-21 ENCOUNTER — NURSE ONLY (OUTPATIENT)
Dept: PRIMARY CARE CLINIC | Facility: CLINIC | Age: 33
End: 2023-08-21

## 2023-08-21 DIAGNOSIS — Z13.220 ENCOUNTER FOR LIPID SCREENING FOR CARDIOVASCULAR DISEASE: ICD-10-CM

## 2023-08-21 DIAGNOSIS — E55.9 VITAMIN D DEFICIENCY: ICD-10-CM

## 2023-08-21 DIAGNOSIS — R73.01 IMPAIRED FASTING GLUCOSE: ICD-10-CM

## 2023-08-21 DIAGNOSIS — Z13.6 ENCOUNTER FOR LIPID SCREENING FOR CARDIOVASCULAR DISEASE: ICD-10-CM

## 2023-08-21 DIAGNOSIS — E55.9 VITAMIN D DEFICIENCY: Primary | ICD-10-CM

## 2023-08-21 LAB
25(OH)D3 SERPL-MCNC: 38.1 NG/ML (ref 30–100)
ALBUMIN SERPL-MCNC: 3.9 G/DL (ref 3.5–5)
ALBUMIN/GLOB SERPL: 1 (ref 0.4–1.6)
ALP SERPL-CCNC: 75 U/L (ref 50–136)
ALT SERPL-CCNC: 18 U/L (ref 12–65)
ANION GAP SERPL CALC-SCNC: 7 MMOL/L (ref 2–11)
AST SERPL-CCNC: 10 U/L (ref 15–37)
BASOPHILS # BLD: 0.1 K/UL (ref 0–0.2)
BASOPHILS NFR BLD: 1 % (ref 0–2)
BILIRUB SERPL-MCNC: 0.3 MG/DL (ref 0.2–1.1)
BUN SERPL-MCNC: 13 MG/DL (ref 6–23)
CALCIUM SERPL-MCNC: 10.6 MG/DL (ref 8.3–10.4)
CHLORIDE SERPL-SCNC: 109 MMOL/L (ref 101–110)
CHOLEST SERPL-MCNC: 173 MG/DL
CO2 SERPL-SCNC: 26 MMOL/L (ref 21–32)
CREAT SERPL-MCNC: 0.9 MG/DL (ref 0.6–1)
DIFFERENTIAL METHOD BLD: ABNORMAL
EOSINOPHIL # BLD: 0.3 K/UL (ref 0–0.8)
EOSINOPHIL NFR BLD: 4 % (ref 0.5–7.8)
ERYTHROCYTE [DISTWIDTH] IN BLOOD BY AUTOMATED COUNT: 16 % (ref 11.9–14.6)
EST. AVERAGE GLUCOSE BLD GHB EST-MCNC: 105 MG/DL
GLOBULIN SER CALC-MCNC: 3.9 G/DL (ref 2.8–4.5)
GLUCOSE SERPL-MCNC: 85 MG/DL (ref 65–100)
HBA1C MFR BLD: 5.3 % (ref 4.8–5.6)
HCT VFR BLD AUTO: 41.1 % (ref 35.8–46.3)
HDLC SERPL-MCNC: 41 MG/DL (ref 40–60)
HDLC SERPL: 4.2
HGB BLD-MCNC: 12.8 G/DL (ref 11.7–15.4)
IMM GRANULOCYTES # BLD AUTO: 0.1 K/UL (ref 0–0.5)
IMM GRANULOCYTES NFR BLD AUTO: 1 % (ref 0–5)
LDLC SERPL CALC-MCNC: 112.8 MG/DL
LYMPHOCYTES # BLD: 3.1 K/UL (ref 0.5–4.6)
LYMPHOCYTES NFR BLD: 35 % (ref 13–44)
MCH RBC QN AUTO: 27.8 PG (ref 26.1–32.9)
MCHC RBC AUTO-ENTMCNC: 31.1 G/DL (ref 31.4–35)
MCV RBC AUTO: 89.3 FL (ref 82–102)
MONOCYTES # BLD: 0.9 K/UL (ref 0.1–1.3)
MONOCYTES NFR BLD: 10 % (ref 4–12)
NEUTS SEG # BLD: 4.5 K/UL (ref 1.7–8.2)
NEUTS SEG NFR BLD: 49 % (ref 43–78)
NRBC # BLD: 0 K/UL (ref 0–0.2)
PLATELET # BLD AUTO: 377 K/UL (ref 150–450)
PMV BLD AUTO: 11.1 FL (ref 9.4–12.3)
POTASSIUM SERPL-SCNC: 4.1 MMOL/L (ref 3.5–5.1)
PROT SERPL-MCNC: 7.8 G/DL (ref 6.3–8.2)
RBC # BLD AUTO: 4.6 M/UL (ref 4.05–5.2)
SODIUM SERPL-SCNC: 142 MMOL/L (ref 133–143)
TRIGL SERPL-MCNC: 96 MG/DL (ref 35–150)
VLDLC SERPL CALC-MCNC: 19.2 MG/DL (ref 6–23)
WBC # BLD AUTO: 8.9 K/UL (ref 4.3–11.1)

## 2023-08-23 ASSESSMENT — PATIENT HEALTH QUESTIONNAIRE - PHQ9
2. FEELING DOWN, DEPRESSED OR HOPELESS: NOT AT ALL
1. LITTLE INTEREST OR PLEASURE IN DOING THINGS: 0
SUM OF ALL RESPONSES TO PHQ QUESTIONS 1-9: 0
SUM OF ALL RESPONSES TO PHQ9 QUESTIONS 1 & 2: 0
SUM OF ALL RESPONSES TO PHQ QUESTIONS 1-9: 0
SUM OF ALL RESPONSES TO PHQ9 QUESTIONS 1 & 2: 0
2. FEELING DOWN, DEPRESSED OR HOPELESS: 0
SUM OF ALL RESPONSES TO PHQ QUESTIONS 1-9: 0
1. LITTLE INTEREST OR PLEASURE IN DOING THINGS: NOT AT ALL
SUM OF ALL RESPONSES TO PHQ QUESTIONS 1-9: 0

## 2023-08-28 ENCOUNTER — OFFICE VISIT (OUTPATIENT)
Dept: PRIMARY CARE CLINIC | Facility: CLINIC | Age: 33
End: 2023-08-28
Payer: COMMERCIAL

## 2023-08-28 VITALS
DIASTOLIC BLOOD PRESSURE: 73 MMHG | HEIGHT: 66 IN | OXYGEN SATURATION: 99 % | WEIGHT: 293 LBS | SYSTOLIC BLOOD PRESSURE: 129 MMHG | BODY MASS INDEX: 47.09 KG/M2 | HEART RATE: 73 BPM | TEMPERATURE: 98.2 F

## 2023-08-28 DIAGNOSIS — E66.01 OBESITY, MORBID (HCC): ICD-10-CM

## 2023-08-28 DIAGNOSIS — E55.9 VITAMIN D DEFICIENCY: ICD-10-CM

## 2023-08-28 DIAGNOSIS — M25.562 CHRONIC PAIN OF BOTH KNEES: Primary | ICD-10-CM

## 2023-08-28 DIAGNOSIS — M25.561 CHRONIC PAIN OF BOTH KNEES: Primary | ICD-10-CM

## 2023-08-28 DIAGNOSIS — G89.29 CHRONIC PAIN OF BOTH KNEES: Primary | ICD-10-CM

## 2023-08-28 PROCEDURE — 99214 OFFICE O/P EST MOD 30 MIN: CPT | Performed by: FAMILY MEDICINE

## 2023-08-28 RX ORDER — MELOXICAM 15 MG/1
15 TABLET ORAL DAILY PRN
Qty: 30 TABLET | Refills: 5 | Status: SHIPPED | OUTPATIENT
Start: 2023-08-28

## 2024-03-08 ENCOUNTER — OFFICE VISIT (OUTPATIENT)
Dept: ORTHOPEDIC SURGERY | Age: 34
End: 2024-03-08

## 2024-03-08 DIAGNOSIS — M89.8X9 NON-OSSIFIED FIBROMA OF BONE: ICD-10-CM

## 2024-03-08 DIAGNOSIS — M22.2X1 PATELLOFEMORAL PAIN SYNDROME OF RIGHT KNEE: Primary | ICD-10-CM

## 2024-03-08 DIAGNOSIS — M89.9 BONE LESION: ICD-10-CM

## 2024-03-08 DIAGNOSIS — M17.11 OSTEOARTHRITIS OF RIGHT KNEE, UNSPECIFIED OSTEOARTHRITIS TYPE: ICD-10-CM

## 2024-03-08 PROCEDURE — 99214 OFFICE O/P EST MOD 30 MIN: CPT | Performed by: PHYSICIAN ASSISTANT

## 2024-03-11 NOTE — PROGRESS NOTES
Name: Cydney Chester  YOB: 1990  Gender: female  MRN: [id number]    CC:   Chief Complaint   Patient presents with    Follow-up     Right knee recheck        HPI: Patient presents for follow up evaluation of the right knee.  Patient previously completed viscosupplementation in June of 2023.  Patient also previously had CSI in March of 2023.  Patient has tried and failed physical therapy with continuation of symptoms.  The patient notes injections barely took the edge off and only for a very short period of time.  Patient notes anterior knee pain.  Notes popping, clicking, locking and swelling.  Occasional interference with sleep.  Notes she wears a knee brace to work.  Denies numbness and tingling.     No Known Allergies  Past Medical History:   Diagnosis Date    Acquired hallux valgus of left foot     Childhood asthma     Depression     no current meds at this time.    Morbid obesity (HCC)     BMI 43.1    Seasonal allergic rhinitis     managed well with meds     Past Surgical History:   Procedure Laterality Date    BUNIONECTOMY Left     TONSILLECTOMY  as a child     Family History   Problem Relation Age of Onset    Diabetes Maternal Aunt     No Known Problems Mother     Diabetes Maternal Grandmother     No Known Problems Father      Social History     Socioeconomic History    Marital status: Single     Spouse name: Not on file    Number of children: Not on file    Years of education: Not on file    Highest education level: Not on file   Occupational History    Not on file   Tobacco Use    Smoking status: Never    Smokeless tobacco: Never   Substance and Sexual Activity    Alcohol use: Yes    Drug use: No    Sexual activity: Not on file   Other Topics Concern    Not on file   Social History Narrative    Not on file     Social Determinants of Health     Financial Resource Strain: Not on file   Food Insecurity: Not on file (2/26/2023)   Transportation Needs: Not on file   Physical Activity: Not on file

## 2024-03-18 RX ORDER — LEVONORGESTREL AND ETHINYL ESTRADIOL 0.1-0.02MG
KIT ORAL
COMMUNITY
Start: 2021-12-28

## 2024-03-22 NOTE — PROGRESS NOTES
spine, deep to the posterior extent of the ACL insertion and deep to the  ACL origin. 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    TT-TG distance = 1.8 cm.   There is 50% lateral patellar subluxation. There is  increased T2 signal within fat interposed between the lateral aspect of the  patella and the trochlear ridge on the axial view. The sagittal image  demonstrates increased signal within the fat below the caudal margin of the  patella.   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    There is a 0.9 x 2.0 x 0.9 cm circumscribed mixed signal lesion in the lateral  aspect of the distal femoral diaphysis, unchanged.  There is a 0.7 x 0.5 x 0.5 cm developing osteochondral defect in the central  portion of the lateral patellar facet with no free fragment, increased.  There is a moderate joint effusion with visible nodular synovial hypertrophy in  the superior lateral joint space, increased.    I reviewed the MRI with she and her mother in the room.  She has a chronic benign NOF.  There were several loose bodies within the lateral gutter and somewhat in the medial gutter.  She has patellofemoral arthritic change.  ACL has some mucoid degeneration.    Recall PRIOR MRI from 1-17-23:  MRI KNEE RIGHT WO CONTRAST     Indication: Derangement of unspecified meniscus due to old tear or injury, right  knee     Comparison: February 25, 2020.     Technique: Noncontrast multiplanar, multiecho imaging of the right knee was  performed, including T1-weighted and fluid sensitive sequences.        FINDINGS:     MEDIAL MENISCUS:  Intact.  LATERAL MENISCUS:  Redemonstrated irregular morphology and increased  intrasubstance signal within the anterior horn of the lateral meniscus, which  may be degenerative. No discrete tear.     ACL:  Intact.  PCL:  Intact.  MCL:  Intact.  LATERAL LIGAMENTS AND TENDONS:  Intact.

## 2024-03-29 ENCOUNTER — OFFICE VISIT (OUTPATIENT)
Dept: ORTHOPEDIC SURGERY | Age: 34
End: 2024-03-29
Payer: COMMERCIAL

## 2024-03-29 DIAGNOSIS — M89.8X9 NON-OSSIFIED FIBROMA OF BONE: ICD-10-CM

## 2024-03-29 DIAGNOSIS — M89.9 BONE LESION: ICD-10-CM

## 2024-03-29 DIAGNOSIS — M17.11 OSTEOARTHRITIS OF RIGHT KNEE, UNSPECIFIED OSTEOARTHRITIS TYPE: ICD-10-CM

## 2024-03-29 DIAGNOSIS — M22.2X1 PATELLOFEMORAL PAIN SYNDROME OF RIGHT KNEE: Primary | ICD-10-CM

## 2024-03-29 PROCEDURE — 99215 OFFICE O/P EST HI 40 MIN: CPT | Performed by: ORTHOPAEDIC SURGERY

## 2024-04-01 DIAGNOSIS — M89.9 BONE LESION: ICD-10-CM

## 2024-04-01 DIAGNOSIS — M17.11 OSTEOARTHRITIS OF RIGHT KNEE, UNSPECIFIED OSTEOARTHRITIS TYPE: ICD-10-CM

## 2024-04-01 DIAGNOSIS — M89.8X9 NON-OSSIFIED FIBROMA OF BONE: ICD-10-CM

## 2024-04-01 DIAGNOSIS — M22.2X1 PATELLOFEMORAL PAIN SYNDROME OF RIGHT KNEE: Primary | ICD-10-CM

## 2024-04-16 DIAGNOSIS — M22.2X1 PATELLOFEMORAL PAIN SYNDROME OF RIGHT KNEE: Primary | ICD-10-CM

## 2024-04-16 RX ORDER — AMOXICILLIN 250 MG
1 CAPSULE ORAL DAILY
Qty: 21 TABLET | Refills: 0 | Status: SHIPPED | OUTPATIENT
Start: 2024-04-16

## 2024-04-16 RX ORDER — ONDANSETRON 8 MG/1
4 TABLET, ORALLY DISINTEGRATING ORAL EVERY 6 HOURS
Qty: 16 TABLET | Refills: 0 | Status: SHIPPED | OUTPATIENT
Start: 2024-04-16

## 2024-04-16 RX ORDER — ASPIRIN 325 MG
325 TABLET ORAL DAILY
Qty: 7 TABLET | Refills: 0 | Status: SHIPPED | OUTPATIENT
Start: 2024-04-16 | End: 2024-04-23

## 2024-04-16 RX ORDER — NALOXONE HYDROCHLORIDE 4 MG/.1ML
1 SPRAY NASAL PRN
Qty: 1 EACH | Refills: 1 | Status: SHIPPED | OUTPATIENT
Start: 2024-04-16

## 2024-04-16 RX ORDER — OXYCODONE AND ACETAMINOPHEN 7.5; 325 MG/1; MG/1
1-2 TABLET ORAL
Qty: 36 TABLET | Refills: 0 | Status: SHIPPED | OUTPATIENT
Start: 2024-04-16 | End: 2024-04-19

## 2024-04-16 RX ORDER — MELOXICAM 7.5 MG/1
7.5 TABLET ORAL 2 TIMES DAILY
Qty: 28 TABLET | Refills: 0 | Status: SHIPPED | OUTPATIENT
Start: 2024-04-16 | End: 2024-04-30

## 2024-04-16 NOTE — PERIOP NOTE
Patient verified name and .  Order for consent not found in EHR patient verifies procedure.   Type 1b surgery, phone assessment complete.  Orders not received.  Labs per surgeon: none  Labs per anesthesia protocol: none    Patient answered medical/surgical history questions at their best of ability. All prior to admission medications documented in EPIC.    Patient instructed to continue taking all prescription medications up to the day of surgery but to take only the following medications the day of surgery according to anesthesia guidelines with a small sip of water: none Also, patient is requested to take 2 Tylenol in the morning and then again before bed on the day before surgery. Regular or extra strength may be used.       Patient informed that all vitamins and supplements should be held 7 days prior to surgery and NSAIDS 5 days prior to surgery. Prescription meds to hold:none    Patient instructed on the following:    > Arrive at [facility] OPC Entrance, time of arrival to be called the day before by 1700  > NPO after midnight, unless otherwise indicated, including gum, mints, and ice chips  > Responsible adult must drive patient to the hospital, stay during surgery, and patient will need supervision 24 hours after anesthesia  > Use non moisturizing soap in shower the night before surgery and on the morning of surgery  > All piercings must be removed prior to arrival.    > Leave all valuables (money and jewelry) at home but bring insurance card and ID on DOS.   > You may be required to pay a deductible or co-pay on the day of your procedure. You can pre-pay by calling [phone] if your surgery is at the [facility] or [phone] if your surgery is at the [facility].  > Do not wear make-up, nail polish, lotions, cologne, perfumes, powders, or oil on skin. Artificial nails are not permitted.

## 2024-04-17 ENCOUNTER — ANESTHESIA EVENT (OUTPATIENT)
Dept: SURGERY | Age: 34
End: 2024-04-17
Payer: COMMERCIAL

## 2024-04-17 NOTE — PERIOP NOTE
Preop department called to notify patient of arrival time for scheduled procedure. Instructions given to   - Arrive at OPC Entrance [address].  - Remain NPO after midnight, unless otherwise indicated, including gum, mints, and ice chips.   - Have a responsible adult to drive patient to the hospital, stay during surgery, and patient will need supervision 24 hours after anesthesia.   - Use antibacterial soap in shower the night before surgery and on the morning of surgery.       Was patient contacted: yes-pt  Voicemail left: n/a  Numbers contacted: [phone]   Arrival time: 0600

## 2024-04-17 NOTE — DISCHARGE INSTRUCTIONS
call the office please have us paged instead of leaving a voice mail so that we can immediately take care of your needs.          Phone [phone]              Fax [phone]             MEDICATION INTERACTION:  During your procedure you potentially received a medication or medications which may reduce the effectiveness of oral contraceptives. Please consider other forms of contraception for 1 month following your procedure if you are currently using oral contraceptives as your primary form of birth control. In addition to this, we recommend continuing your oral contraceptive as prescribed, unless otherwise instructed by your physician, during this time    After general anesthesia or intravenous sedation, for 24 hours or while taking prescription Narcotics:  Limit your activities  A responsible adult needs to be with you for the next 24 hours  Do not drive and operate hazardous machinery  Do not make important personal or business decisions  Do not drink alcoholic beverages  If you have not urinated within 8 hours after discharge, and you are experiencing discomfort from urinary retention, please go to the nearest ED.  If you have sleep apnea and have a CPAP machine, please use it for all naps and sleeping.  Please use caution when taking narcotics and any of your home medications that may cause drowsiness.  *  Please give a list of your current medications to your Primary Care Provider.  *  Please update this list whenever your medications are discontinued, doses are      changed, or new medications (including over-the-counter products) are added.  *  Please carry medication information at all times in case of emergency situations.    These are general instructions for a healthy lifestyle:  No smoking/ No tobacco products/ Avoid exposure to second hand smoke  Surgeon General's Warning:  Quitting smoking now greatly reduces serious risk to your health.  Obesity, smoking, and sedentary lifestyle greatly  increases your risk for illness  A healthy diet, regular physical exercise & weight monitoring are important for maintaining a healthy lifestyle    You may be retaining fluid if you have a history of heart failure or if you experience any of the following symptoms:  Weight gain of 3 pounds or more overnight or 5 pounds in a week, increased swelling in our hands or feet or shortness of breath while lying flat in bed.  Please call your doctor as soon as you notice any of these symptoms; do not wait until your next office visit.

## 2024-04-17 NOTE — H&P
perioperative period and any recovery window from their procedure.  The patient was made aware that peggy Covid-19  may worsen their prognosis for recovering from their procedure and lend to a higher morbidity and/or mortality risk.  All material risks, benefits, and reasonable alternatives including postponing the procedure were discussed. The patient does  wish to proceed with the procedure at this time.      Signed By: Sacha Perez MD     April 18, 2024 6:47 AM           
done

## 2024-04-18 ENCOUNTER — ANESTHESIA (OUTPATIENT)
Dept: SURGERY | Age: 34
End: 2024-04-18
Payer: COMMERCIAL

## 2024-04-18 ENCOUNTER — HOSPITAL ENCOUNTER (OUTPATIENT)
Age: 34
Setting detail: OUTPATIENT SURGERY
Discharge: HOME | End: 2024-04-18
Attending: ORTHOPAEDIC SURGERY | Admitting: ORTHOPAEDIC SURGERY
Payer: COMMERCIAL

## 2024-04-18 VITALS
WEIGHT: 293 LBS | DIASTOLIC BLOOD PRESSURE: 102 MMHG | RESPIRATION RATE: 18 BRPM | TEMPERATURE: 92.2 F | SYSTOLIC BLOOD PRESSURE: 159 MMHG | HEIGHT: 66 IN | HEART RATE: 67 BPM | BODY MASS INDEX: 47.09 KG/M2 | OXYGEN SATURATION: 96 %

## 2024-04-18 PROBLEM — M65.961 SYNOVITIS OF RIGHT KNEE: Status: ACTIVE | Noted: 2024-04-18

## 2024-04-18 PROBLEM — M65.9 SYNOVITIS OF RIGHT KNEE: Status: ACTIVE | Noted: 2024-04-18

## 2024-04-18 PROCEDURE — 29875 ARTHRS KNEE SURG SYNVCT LMTD: CPT | Performed by: ORTHOPAEDIC SURGERY

## 2024-04-18 PROCEDURE — 3600000004 HC SURGERY LEVEL 4 BASE: Performed by: ORTHOPAEDIC SURGERY

## 2024-04-18 PROCEDURE — 6360000002 HC RX W HCPCS: Performed by: ORTHOPAEDIC SURGERY

## 2024-04-18 PROCEDURE — 2580000003 HC RX 258: Performed by: ANESTHESIOLOGY

## 2024-04-18 PROCEDURE — 29873 ARTHRS KNEE SURG W/LAT RLS: CPT | Performed by: ORTHOPAEDIC SURGERY

## 2024-04-18 PROCEDURE — 6360000002 HC RX W HCPCS: Performed by: NURSE ANESTHETIST, CERTIFIED REGISTERED

## 2024-04-18 PROCEDURE — 7100000000 HC PACU RECOVERY - FIRST 15 MIN: Performed by: ORTHOPAEDIC SURGERY

## 2024-04-18 PROCEDURE — 3600000014 HC SURGERY LEVEL 4 ADDTL 15MIN: Performed by: ORTHOPAEDIC SURGERY

## 2024-04-18 PROCEDURE — 2500000003 HC RX 250 WO HCPCS: Performed by: NURSE ANESTHETIST, CERTIFIED REGISTERED

## 2024-04-18 PROCEDURE — 3700000001 HC ADD 15 MINUTES (ANESTHESIA): Performed by: ORTHOPAEDIC SURGERY

## 2024-04-18 PROCEDURE — 7100000001 HC PACU RECOVERY - ADDTL 15 MIN: Performed by: ORTHOPAEDIC SURGERY

## 2024-04-18 PROCEDURE — 88305 TISSUE EXAM BY PATHOLOGIST: CPT

## 2024-04-18 PROCEDURE — 7100000010 HC PHASE II RECOVERY - FIRST 15 MIN: Performed by: ORTHOPAEDIC SURGERY

## 2024-04-18 PROCEDURE — 6360000002 HC RX W HCPCS: Performed by: PHYSICIAN ASSISTANT

## 2024-04-18 PROCEDURE — 3700000000 HC ANESTHESIA ATTENDED CARE: Performed by: ORTHOPAEDIC SURGERY

## 2024-04-18 PROCEDURE — 2709999900 HC NON-CHARGEABLE SUPPLY: Performed by: ORTHOPAEDIC SURGERY

## 2024-04-18 PROCEDURE — 7100000011 HC PHASE II RECOVERY - ADDTL 15 MIN: Performed by: ORTHOPAEDIC SURGERY

## 2024-04-18 RX ORDER — SUCCINYLCHOLINE/SOD CL,ISO/PF 200MG/10ML
SYRINGE (ML) INTRAVENOUS PRN
Status: DISCONTINUED | OUTPATIENT
Start: 2024-04-18 | End: 2024-04-18 | Stop reason: SDUPTHER

## 2024-04-18 RX ORDER — ONDANSETRON 2 MG/ML
4 INJECTION INTRAMUSCULAR; INTRAVENOUS
Status: DISCONTINUED | OUTPATIENT
Start: 2024-04-18 | End: 2024-04-18 | Stop reason: HOSPADM

## 2024-04-18 RX ORDER — SODIUM CHLORIDE 9 MG/ML
INJECTION, SOLUTION INTRAVENOUS PRN
Status: DISCONTINUED | OUTPATIENT
Start: 2024-04-18 | End: 2024-04-18 | Stop reason: HOSPADM

## 2024-04-18 RX ORDER — TRANEXAMIC ACID 100 MG/ML
INJECTION, SOLUTION INTRAVENOUS PRN
Status: DISCONTINUED | OUTPATIENT
Start: 2024-04-18 | End: 2024-04-18 | Stop reason: SDUPTHER

## 2024-04-18 RX ORDER — PROPOFOL 10 MG/ML
INJECTION, EMULSION INTRAVENOUS PRN
Status: DISCONTINUED | OUTPATIENT
Start: 2024-04-18 | End: 2024-04-18 | Stop reason: SDUPTHER

## 2024-04-18 RX ORDER — LIDOCAINE HYDROCHLORIDE 20 MG/ML
INJECTION, SOLUTION EPIDURAL; INFILTRATION; INTRACAUDAL; PERINEURAL PRN
Status: DISCONTINUED | OUTPATIENT
Start: 2024-04-18 | End: 2024-04-18 | Stop reason: SDUPTHER

## 2024-04-18 RX ORDER — OXYCODONE HYDROCHLORIDE 5 MG/1
5 TABLET ORAL
Status: DISCONTINUED | OUTPATIENT
Start: 2024-04-18 | End: 2024-04-18 | Stop reason: HOSPADM

## 2024-04-18 RX ORDER — ROPIVACAINE HYDROCHLORIDE 5 MG/ML
INJECTION, SOLUTION EPIDURAL; INFILTRATION; PERINEURAL PRN
Status: DISCONTINUED | OUTPATIENT
Start: 2024-04-18 | End: 2024-04-18 | Stop reason: ALTCHOICE

## 2024-04-18 RX ORDER — FENTANYL CITRATE 50 UG/ML
INJECTION, SOLUTION INTRAMUSCULAR; INTRAVENOUS PRN
Status: DISCONTINUED | OUTPATIENT
Start: 2024-04-18 | End: 2024-04-18 | Stop reason: SDUPTHER

## 2024-04-18 RX ORDER — LABETALOL HYDROCHLORIDE 5 MG/ML
10 INJECTION, SOLUTION INTRAVENOUS
Status: DISCONTINUED | OUTPATIENT
Start: 2024-04-18 | End: 2024-04-18 | Stop reason: HOSPADM

## 2024-04-18 RX ORDER — SODIUM CHLORIDE 0.9 % (FLUSH) 0.9 %
5-40 SYRINGE (ML) INJECTION EVERY 12 HOURS SCHEDULED
Status: DISCONTINUED | OUTPATIENT
Start: 2024-04-18 | End: 2024-04-18 | Stop reason: HOSPADM

## 2024-04-18 RX ORDER — PROCHLORPERAZINE EDISYLATE 5 MG/ML
5 INJECTION INTRAMUSCULAR; INTRAVENOUS
Status: DISCONTINUED | OUTPATIENT
Start: 2024-04-18 | End: 2024-04-18 | Stop reason: HOSPADM

## 2024-04-18 RX ORDER — SODIUM CHLORIDE 0.9 % (FLUSH) 0.9 %
5-40 SYRINGE (ML) INJECTION EVERY 8 HOURS
Status: DISCONTINUED | OUTPATIENT
Start: 2024-04-18 | End: 2024-04-18 | Stop reason: HOSPADM

## 2024-04-18 RX ORDER — LIDOCAINE HYDROCHLORIDE 10 MG/ML
1 INJECTION, SOLUTION INFILTRATION; PERINEURAL
Status: DISCONTINUED | OUTPATIENT
Start: 2024-04-18 | End: 2024-04-18 | Stop reason: HOSPADM

## 2024-04-18 RX ORDER — SODIUM CHLORIDE 0.9 % (FLUSH) 0.9 %
5-40 SYRINGE (ML) INJECTION PRN
Status: DISCONTINUED | OUTPATIENT
Start: 2024-04-18 | End: 2024-04-18 | Stop reason: HOSPADM

## 2024-04-18 RX ORDER — ONDANSETRON 2 MG/ML
INJECTION INTRAMUSCULAR; INTRAVENOUS PRN
Status: DISCONTINUED | OUTPATIENT
Start: 2024-04-18 | End: 2024-04-18 | Stop reason: SDUPTHER

## 2024-04-18 RX ORDER — DEXAMETHASONE SODIUM PHOSPHATE 10 MG/ML
INJECTION INTRAMUSCULAR; INTRAVENOUS PRN
Status: DISCONTINUED | OUTPATIENT
Start: 2024-04-18 | End: 2024-04-18 | Stop reason: SDUPTHER

## 2024-04-18 RX ORDER — SODIUM CHLORIDE 9 MG/ML
INJECTION, SOLUTION INTRAVENOUS CONTINUOUS
Status: DISCONTINUED | OUTPATIENT
Start: 2024-04-18 | End: 2024-04-18 | Stop reason: HOSPADM

## 2024-04-18 RX ORDER — ACETAMINOPHEN 500 MG
1000 TABLET ORAL ONCE
Status: DISCONTINUED | OUTPATIENT
Start: 2024-04-18 | End: 2024-04-18 | Stop reason: HOSPADM

## 2024-04-18 RX ORDER — HYDROMORPHONE HYDROCHLORIDE 2 MG/ML
0.5 INJECTION, SOLUTION INTRAMUSCULAR; INTRAVENOUS; SUBCUTANEOUS EVERY 5 MIN PRN
Status: DISCONTINUED | OUTPATIENT
Start: 2024-04-18 | End: 2024-04-18 | Stop reason: HOSPADM

## 2024-04-18 RX ORDER — MIDAZOLAM HYDROCHLORIDE 1 MG/ML
INJECTION INTRAMUSCULAR; INTRAVENOUS PRN
Status: DISCONTINUED | OUTPATIENT
Start: 2024-04-18 | End: 2024-04-18 | Stop reason: SDUPTHER

## 2024-04-18 RX ORDER — NALOXONE HYDROCHLORIDE 0.4 MG/ML
INJECTION, SOLUTION INTRAMUSCULAR; INTRAVENOUS; SUBCUTANEOUS PRN
Status: DISCONTINUED | OUTPATIENT
Start: 2024-04-18 | End: 2024-04-18 | Stop reason: HOSPADM

## 2024-04-18 RX ORDER — HYDROMORPHONE HYDROCHLORIDE 2 MG/ML
0.25 INJECTION, SOLUTION INTRAMUSCULAR; INTRAVENOUS; SUBCUTANEOUS EVERY 5 MIN PRN
Status: DISCONTINUED | OUTPATIENT
Start: 2024-04-18 | End: 2024-04-18 | Stop reason: HOSPADM

## 2024-04-18 RX ORDER — SODIUM CHLORIDE, SODIUM LACTATE, POTASSIUM CHLORIDE, CALCIUM CHLORIDE 600; 310; 30; 20 MG/100ML; MG/100ML; MG/100ML; MG/100ML
INJECTION, SOLUTION INTRAVENOUS CONTINUOUS
Status: DISCONTINUED | OUTPATIENT
Start: 2024-04-18 | End: 2024-04-18 | Stop reason: HOSPADM

## 2024-04-18 RX ORDER — MIDAZOLAM HYDROCHLORIDE 2 MG/2ML
2 INJECTION, SOLUTION INTRAMUSCULAR; INTRAVENOUS
Status: DISCONTINUED | OUTPATIENT
Start: 2024-04-18 | End: 2024-04-18 | Stop reason: HOSPADM

## 2024-04-18 RX ORDER — HYDRALAZINE HYDROCHLORIDE 20 MG/ML
10 INJECTION INTRAMUSCULAR; INTRAVENOUS
Status: DISCONTINUED | OUTPATIENT
Start: 2024-04-18 | End: 2024-04-18 | Stop reason: HOSPADM

## 2024-04-18 RX ORDER — FENTANYL CITRATE 50 UG/ML
100 INJECTION, SOLUTION INTRAMUSCULAR; INTRAVENOUS
Status: DISCONTINUED | OUTPATIENT
Start: 2024-04-18 | End: 2024-04-18 | Stop reason: HOSPADM

## 2024-04-18 RX ADMIN — FENTANYL CITRATE 25 MCG: 50 INJECTION, SOLUTION INTRAMUSCULAR; INTRAVENOUS at 08:32

## 2024-04-18 RX ADMIN — PROPOFOL 300 MG: 10 INJECTION, EMULSION INTRAVENOUS at 08:11

## 2024-04-18 RX ADMIN — LIDOCAINE HYDROCHLORIDE 100 MG: 20 INJECTION, SOLUTION EPIDURAL; INFILTRATION; INTRACAUDAL; PERINEURAL at 08:11

## 2024-04-18 RX ADMIN — FENTANYL CITRATE 50 MCG: 50 INJECTION, SOLUTION INTRAMUSCULAR; INTRAVENOUS at 08:09

## 2024-04-18 RX ADMIN — FENTANYL CITRATE 25 MCG: 50 INJECTION, SOLUTION INTRAMUSCULAR; INTRAVENOUS at 09:19

## 2024-04-18 RX ADMIN — DEXAMETHASONE SODIUM PHOSPHATE 10 MG: 10 INJECTION INTRAMUSCULAR; INTRAVENOUS at 08:11

## 2024-04-18 RX ADMIN — Medication 3000 MG: at 08:18

## 2024-04-18 RX ADMIN — Medication 200 MG: at 08:11

## 2024-04-18 RX ADMIN — ONDANSETRON 4 MG: 2 INJECTION INTRAMUSCULAR; INTRAVENOUS at 08:11

## 2024-04-18 RX ADMIN — SODIUM CHLORIDE, POTASSIUM CHLORIDE, SODIUM LACTATE AND CALCIUM CHLORIDE: 600; 310; 30; 20 INJECTION, SOLUTION INTRAVENOUS at 06:14

## 2024-04-18 RX ADMIN — MIDAZOLAM 2 MG: 1 INJECTION INTRAMUSCULAR; INTRAVENOUS at 08:09

## 2024-04-18 RX ADMIN — TRANEXAMIC ACID 1000 MG: 100 INJECTION, SOLUTION INTRAVENOUS at 08:31

## 2024-04-18 ASSESSMENT — PAIN DESCRIPTION - DESCRIPTORS: DESCRIPTORS: ACHING

## 2024-04-18 ASSESSMENT — PAIN - FUNCTIONAL ASSESSMENT: PAIN_FUNCTIONAL_ASSESSMENT: 0-10

## 2024-04-18 NOTE — ANESTHESIA POSTPROCEDURE EVALUATION
Department of Anesthesiology  Postprocedure Note    Patient: Cydney Chester  MRN: [id number]  YOB: 1990  Date of evaluation: 4/18/2024    Procedure Summary       Date: 04/18/24 Room / Location: [facility] OP OR 03 / [facility] OPC    Anesthesia Start: 0758 Anesthesia Stop: 0931    Procedures:       RIGHT KNEE ARTHROSCOPY LATERAL RELEASE, REMOVAL OF LOOSE BODIES (Right: Knee)      RIGHT KNEE ARTHROSCOPY CHONDROPLASTY and extensive synovectomy. 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

## 2024-04-18 NOTE — OP NOTE
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   [id number]  Modifier 22: Due to the patients morbid obesity with a BMI of Body mass index is 48.91 kg/m²., the difficulty and complexity of the case was increased requiring additional equipment and supplies as well as prolonging the operative time by 15 minutes. These factors rendered the procedure unusual and complex as positioning took longer, the blood loss was greater, exposure was more difficult for the proper completion of the case.     ID Type Source Tests Collected by Time Destination   A : Right Knee Synovial Tissue possible PVNS Tissue Joint, Knee SURGICAL PATHOLOGY DEEDEE Perez MD 4/18/2024 0847          Findings:  1. EUA -   - lachman's and - pivot shift. Stable to varus and valgus.  2. PFJ -patient had central and lateral facet chondromalacia grade 3 requiring debridement with a shaver and werewolf Coblation device.  Patient did have extensive what appeared to be synovitic hemosiderin laden tissue that extended both in the lateral gutter some of the superior patellar space as well as the medial gutter.  This was debrided into a soft tissue trap and sent for specimen.  3. Medial Joint - The medial meniscus  meeting. This was confirmed using the written consent and history/physical. Time-out for antibiotic dosing, timing and selection was also performed.      An esmarch was used to exsanguinate the leg and the tourniquet was inflated to 300 mmhg. An incision was made and the scope was introduced anterolaterally and an anteromedial portal was developed with the use of spinal needle localization.  The patellofemoral joint was viewed and the articular surfaces were noted to show areas of chondromalacia over the central patella and lateral facet.  This was debrided with a shaver and a chondroplasty was performed.  The werewolf Coblation device was also used to stabilize this tissue.  There is a small central fissure in the trochlea..  The medial and lateral gutters were reviewed and they were noted as well as the superior patellar pouch to be laced with hemosiderin staining synovium and some lobular tissue consistent with possible PVNS.  This was debrided with a shaver into a soft tissue trap to be sent as a specimen for pathology..  The medial compartment was viewed and the articular surfaces were noted.  The medial meniscus was probed and appeared stable.  The notch was viewed and the ACL and PCL were normal except for a small 1 cm globular cyclops like hemosiderin stained tissue ball.  This was debrided as well..  The lateral compartment was viewed and probed and the articular surface and lateral meniscus was normal.  Both the medial and lateral posterior compartments were also visualized and debrided of any hemosiderin stained tissue.  This completed an extensive synovectomy.  Attention was turned back to the patellofemoral compartment.  The bovie was used then to perform a lateral release from just proximal to the patella superior and lateral down to the distal extent of the patella inferiorly. Hemostasis was obtained.  The scope was then placed superiorly.  Any other loose bits of debris were removed from the joint.

## 2024-04-18 NOTE — ANESTHESIA PRE PROCEDURE
the last 72 hours.    Coags: No results found for: \"PROTIME\", \"INR\", \"APTT\"    HCG (If Applicable): No results found for: \"PREGTESTUR\", \"PREGSERUM\", \"HCG\", \"HCGQUANT\"     ABGs: No results found for: \"PHART\", \"PO2ART\", \"FIN5ENI\", \"JOR4OIM\", \"BEART\", \"G0BKSSQT\"     Type & Screen (If Applicable):  No results found for: \"LABABO\", \"LABRH\"    Drug/Infectious Status (If Applicable):  No results found for: \"HIV\", \"HEPCAB\"    COVID-19 Screening (If Applicable):   Lab Results   Component Value Date/Time    COVID19 Not Detected 06/04/2020 12:00 AM           Anesthesia Evaluation  Patient summary reviewed  Airway: Mallampati: II  TM distance: >3 FB   Neck ROM: full  Mouth opening: > = 3 FB   Dental: normal exam         Pulmonary: breath sounds clear to auscultation  (+)           asthma (childhood):                            Cardiovascular:Negative CV ROS  Exercise tolerance: good (>4 METS)          Rhythm: regular  Rate: normal                    Neuro/Psych:   (+) depression/anxiety             GI/Hepatic/Renal:   (+) morbid obesity (BMI 49)          Endo/Other: Negative Endo/Other ROS                    Abdominal:             Vascular: negative vascular ROS.         Other Findings:             Anesthesia Plan      general     ASA 3     (R/B/I of GETA discussed at length with pt.  Expresses understanding, agreeable to proceed.  Questions answered)  Induction: intravenous.    MIPS: Postoperative opioids intended and Prophylactic antiemetics administered.  Anesthetic plan and risks discussed with patient and mother.                        John Briscoe, DO   4/18/2024

## 2024-04-22 ENCOUNTER — HOSPITAL ENCOUNTER (OUTPATIENT)
Dept: PHYSICAL THERAPY | Age: 34
Setting detail: RECURRING SERIES
Discharge: HOME | End: 2024-04-25
Payer: COMMERCIAL

## 2024-04-22 ENCOUNTER — CLINICAL DOCUMENTATION (OUTPATIENT)
Dept: ORTHOPEDIC SURGERY | Age: 34
End: 2024-04-22

## 2024-04-22 DIAGNOSIS — M25.661 STIFFNESS OF RIGHT KNEE, NOT ELSEWHERE CLASSIFIED: ICD-10-CM

## 2024-04-22 DIAGNOSIS — M25.561 RIGHT KNEE PAIN, UNSPECIFIED CHRONICITY: Primary | ICD-10-CM

## 2024-04-22 DIAGNOSIS — R26.2 DIFFICULTY IN WALKING, NOT ELSEWHERE CLASSIFIED: ICD-10-CM

## 2024-04-22 DIAGNOSIS — M12.20 PVNS (PIGMENTED VILLONODULAR SYNOVITIS): Primary | ICD-10-CM

## 2024-04-22 PROCEDURE — 97161 PT EVAL LOW COMPLEX 20 MIN: CPT

## 2024-04-22 PROCEDURE — 97110 THERAPEUTIC EXERCISES: CPT

## 2024-04-22 NOTE — THERAPY EVALUATION
Cydney Chester  : 1990  Primary: [facility] ([employer])  Secondary:  [facility] [place]  [address]  [facility] [address]  Phone: [phone]  Fax: [phone] Plan Frequency: 2 sessions per week for 90 days (with the potential to reduce to 1 session per week pending patient presentation or availability)    Plan of Care/Certification Expiration Date: 24        Plan of Care/Certification Expiration Date:  Plan of Care/Certification Expiration Date: 24    Frequency/Duration: Plan Frequency: 2 sessions per week for 90 days (with the potential to reduce to 1 session per week pending patient presentation or availability)      Time In/Out:   Time In: 1338  Time Out: 1431      PT Visit Info:         Visit Count:  1                OUTPATIENT PHYSICAL THERAPY:             Initial Assessment 2024               Episode (S/p Knee Surgery)         Treatment Diagnosis:     Right knee pain, unspecified chronicity  Stiffness of right knee, not elsewhere classified  Difficulty in walking, not elsewhere classified  Medical/Referring Diagnosis:    Patellofemoral pain syndrome of right knee [M22.2X1]  Osteoarthritis of right knee, unspecified osteoarthritis type [M17.11]  Bone lesion [M89.9]  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  Restrictions/Precautions:    Post Surgical Precautions: R knee surgery      Medications Last Reviewed:  2024     SUBJECTIVE   History of Injury/Illness (Reason for Referral):  Ms. Cydney Chester has attended 1 physical therapy session including initial evaluation as of 2024. Cydney Chester is a 34 y.o. female with complaints of R knee pain secondary to knee surgery on 24. Patient reports having

## 2024-04-23 ENCOUNTER — CLINICAL DOCUMENTATION (OUTPATIENT)
Dept: ORTHOPEDIC SURGERY | Age: 34
End: 2024-04-23

## 2024-04-24 NOTE — PROGRESS NOTES
Cydney Scottntnaz Chester  : 1990  Primary: [employer] [facility] ([employer])  Secondary:  [facility] [place]  [address]  [facility] [address]  Phone: [phone]  Fax: [phone] Plan Frequency: 2 sessions per week for 90 days (with the potential to reduce to 1 session per week pending patient presentation or availability)  Plan of Care/Certification Expiration Date: 24        Plan of Care/Certification Expiration Date:  Plan of Care/Certification Expiration Date: 24    Frequency/Duration: Plan Frequency: 2 sessions per week for 90 days (with the potential to reduce to 1 session per week pending patient presentation or availability)      Time In/Out:   Time In: 1338  Time Out: 1431      PT Visit Info:         Visit Count:  1    OUTPATIENT PHYSICAL THERAPY:   Treatment Note 2024       Episode  (S/p Knee Surgery)               Treatment Diagnosis:    Right knee pain, unspecified chronicity  Stiffness of right knee, not elsewhere classified  Difficulty in walking, not elsewhere classified  Medical/Referring Diagnosis:    Patellofemoral pain syndrome of right knee [M22.2X1]  Osteoarthritis of right knee, unspecified osteoarthritis type [M17.11]  Bone lesion [M89.9]  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  Restrictions/Precautions:   Post Surgical Precautions: Knee surgery      Interventions Planned (Treatment may consist of any combination of the following):     See Assessment Note    Subjective Comments:   Patient reports R knee pain secondary to knee surgery  Initial Pain Level::     5/10  Post Session Pain Level:       5/10  Medications Last Reviewed:  2024  Updated Objective Findings:  See Evaluation Note from today  Treatment

## 2024-04-29 ENCOUNTER — HOSPITAL ENCOUNTER (OUTPATIENT)
Dept: PHYSICAL THERAPY | Age: 34
Setting detail: RECURRING SERIES
Discharge: HOME | End: 2024-05-02
Payer: COMMERCIAL

## 2024-04-29 PROCEDURE — 97110 THERAPEUTIC EXERCISES: CPT

## 2024-04-29 ASSESSMENT — PAIN SCALES - GENERAL: PAINLEVEL_OUTOF10: 3

## 2024-04-29 NOTE — PROGRESS NOTES
Cydney Fransisca Chester  : 1990  Primary: [employer] [facility] [employer] [facility])  Secondary:  [facility] [place]  [address]  [facility] [address]  Phone: [phone]  Fax: [phone] Plan Frequency: 2 sessions per week for 90 days (with the potential to reduce to 1 session per week pending patient presentation or availability)  Plan of Care/Certification Expiration Date: 24        Plan of Care/Certification Expiration Date:  Plan of Care/Certification Expiration Date: 24    Frequency/Duration: Plan Frequency: 2 sessions per week for 90 days (with the potential to reduce to 1 session per week pending patient presentation or availability)      Time In/Out:   Time In: 0  Time Out:       PT Visit Info:         Visit Count:  2    OUTPATIENT PHYSICAL THERAPY:   Treatment Note 2024       Episode  (S/p Knee Surgery)               Treatment Diagnosis:    Right knee pain, unspecified chronicity  Stiffness of right knee, not elsewhere classified  Difficulty in walking, not elsewhere classified  Medical/Referring Diagnosis:    Patellofemoral pain syndrome of right knee [M22.2X1]  Osteoarthritis of right knee, unspecified osteoarthritis type [M17.11]  Bone lesion [M89.9]  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  Restrictions/Precautions:   Post Surgical Precautions: Knee surgery      Interventions Planned (Treatment may consist of any combination of the following):     See Assessment Note    Subjective Comments:   Patient reported 60 % improvement since her evaluation in strength and mobility.   Initial Pain Level::     3/10  Post Session Pain Level:       5/10  Medications Last Reviewed:  2024  Updated Objective Findings:   Pt. Had good

## 2024-05-01 ENCOUNTER — CLINICAL DOCUMENTATION (OUTPATIENT)
Dept: ORTHOPEDIC SURGERY | Age: 34
End: 2024-05-01

## 2024-05-01 ENCOUNTER — OFFICE VISIT (OUTPATIENT)
Dept: ORTHOPEDIC SURGERY | Age: 34
End: 2024-05-01

## 2024-05-01 DIAGNOSIS — Z09 SURGERY FOLLOW-UP: Primary | ICD-10-CM

## 2024-05-01 DIAGNOSIS — M89.9 BONE LESION: ICD-10-CM

## 2024-05-01 DIAGNOSIS — M22.2X1 PATELLOFEMORAL PAIN SYNDROME OF RIGHT KNEE: ICD-10-CM

## 2024-05-01 DIAGNOSIS — M17.11 OSTEOARTHRITIS OF RIGHT KNEE, UNSPECIFIED OSTEOARTHRITIS TYPE: ICD-10-CM

## 2024-05-01 DIAGNOSIS — M89.8X9 NON-OSSIFIED FIBROMA OF BONE: ICD-10-CM

## 2024-05-01 DIAGNOSIS — M12.20 PVNS (PIGMENTED VILLONODULAR SYNOVITIS): ICD-10-CM

## 2024-05-01 PROCEDURE — 99024 POSTOP FOLLOW-UP VISIT: CPT | Performed by: ORTHOPAEDIC SURGERY

## 2024-05-01 NOTE — PROGRESS NOTES
Name: Cydney Chester  YOB: 1990  Gender: female  MRN: [id number]    CC:   Chief Complaint   Patient presents with    Follow-up     1st p/o R Knee scope lateral release, removal of loose bodies, chondroplasty and extensive synovectomy DOS 4/18/24       Right Knee Arthroscopy Lateral Release, Removal Of Loose Bodies - Right and RIGHT KNEE ARTHROSCOPY CHONDROPLASTY and extensive synovectomy. - Right  4/18/2024    HPI: The patient comes in approximately 1 week out from their knee arthroscopy.  They are having some occasional pain and swelling, otherwise, doing well.  Taking minimal pain medications.  Using crutches still and seeing therapy twice a week.  She has not heard anything from the referral yet.    Review of Systems  Noncontributory     PE:    good active motion, trace effusion.  Portals are well healed without signs of infection.  Calf is soft, nontender.  She is able to fire her quad but she still has some weakness there.    Pathology from 4-18-24:  Microscopic Description   . Microscopic examination reveals spindle cells in association with   multinucleate giant cells and histiocytes of which have hemosiderin.   Significant mitotic activity and nuclear pleomorphism are not identified.   Dr. Isael Huff will concur     A/Plan:     ICD-10-CM    1. Surgery follow-up  Z09       2. Patellofemoral pain syndrome of right knee  M22.2X1       3. Osteoarthritis of right knee, unspecified osteoarthritis type  M17.11       4. Bone lesion  M89.9       5. Non-ossified fibroma of bone  M89.8X9       6. 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  Follow-up 3-4 weeks.  I did go over the arthroscopic findings with the patient.  They may have a prolonged or incomplete recovery. In the future it is possible that they may need further treatment both for the mild arthritis down the road but also PVNS.  Suggest orthopedic oncology evaluation to

## 2024-05-03 ENCOUNTER — HOSPITAL ENCOUNTER (OUTPATIENT)
Dept: PHYSICAL THERAPY | Age: 34
Setting detail: RECURRING SERIES
Discharge: HOME | End: 2024-05-06
Payer: COMMERCIAL

## 2024-05-03 PROCEDURE — 97110 THERAPEUTIC EXERCISES: CPT

## 2024-05-03 ASSESSMENT — PAIN SCALES - GENERAL: PAINLEVEL_OUTOF10: 0

## 2024-05-03 NOTE — PROGRESS NOTES
Cydney Fransisca Chester  : 1990  Primary: [employer] [facility])  Secondary:  [facility] [place]  [address]  [facility] [address]  Phone: [phone]  Fax: [phone] Plan Frequency: 2 sessions per week for 90 days (with the potential to reduce to 1 session per week pending patient presentation or availability)  Plan of Care/Certification Expiration Date: 24        Plan of Care/Certification Expiration Date:  Plan of Care/Certification Expiration Date: 24    Frequency/Duration: Plan Frequency: 2 sessions per week for 90 days (with the potential to reduce to 1 session per week pending patient presentation or availability)      Time In/Out:   Time In: 1100  Time Out: 1205      PT Visit Info:         Visit Count:  3    OUTPATIENT PHYSICAL THERAPY:   Treatment Note 5/3/2024       Episode  (S/p Knee Surgery)               Treatment Diagnosis:    Right knee pain, unspecified chronicity  Stiffness of right knee, not elsewhere classified  Difficulty in walking, not elsewhere classified  Medical/Referring Diagnosis:    Patellofemoral pain syndrome of right knee [M22.2X1]  Osteoarthritis of right knee, unspecified osteoarthritis type [M17.11]  Bone lesion [M89.9]  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  Restrictions/Precautions:   Post Surgical Precautions: Knee surgery      Interventions Planned (Treatment may consist of any combination of the following):     See Assessment Note    Subjective Comments:   Patient reported no pain and no issues with HEP followed by ice.   Initial Pain Level::     0/10  Post Session Pain Level:       2/10  Medications Last Reviewed:  5/3/2024  Updated Objective Findings:   Pt. Demonstrated better gait and

## 2024-05-06 ENCOUNTER — HOSPITAL ENCOUNTER (OUTPATIENT)
Dept: PHYSICAL THERAPY | Age: 34
Setting detail: RECURRING SERIES
Discharge: HOME | End: 2024-05-09
Payer: COMMERCIAL

## 2024-05-06 PROCEDURE — 97110 THERAPEUTIC EXERCISES: CPT

## 2024-05-06 ASSESSMENT — PAIN SCALES - GENERAL: PAINLEVEL_OUTOF10: 2

## 2024-05-06 NOTE — PROGRESS NOTES
Cydney Scottntnaz Chester  : 1990  Primary: [employer] [facility])  Secondary:  [facility] [place]  [address]  [address]  [facility] [address]  Phone: [phone]  Fax: [phone] Plan Frequency: 2 sessions per week for 90 days (with the potential to reduce to 1 session per week pending patient presentation or availability)  Plan of Care/Certification Expiration Date: 24        Plan of Care/Certification Expiration Date:  Plan of Care/Certification Expiration Date: 24    Frequency/Duration: Plan Frequency: 2 sessions per week for 90 days (with the potential to reduce to 1 session per week pending patient presentation or availability)      Time In/Out:   Time In: 1330  Time Out: 1430      PT Visit Info:         Visit Count:  4    OUTPATIENT PHYSICAL THERAPY:   Treatment Note 2024       Episode  (S/p Knee Surgery)               Treatment Diagnosis:    Right knee pain, unspecified chronicity  Stiffness of right knee, not elsewhere classified  Difficulty in walking, not elsewhere classified  Medical/Referring Diagnosis:    Patellofemoral pain syndrome of right knee [M22.2X1]  Osteoarthritis of right knee, unspecified osteoarthritis type [M17.11]  Bone lesion [M89.9]  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  Restrictions/Precautions:   Post Surgical Precautions: Knee surgery      Interventions Planned (Treatment may consist of any combination of the following):     See Assessment Note    Subjective Comments:   Patient reported minimum pain today and getting along well with and without crutches.    Initial Pain Level::     2/10  Post Session Pain Level:       2/10  Medications Last Reviewed:  2024  Updated Objective Findings:   Pt.

## 2024-05-09 ENCOUNTER — HOSPITAL ENCOUNTER (OUTPATIENT)
Dept: PHYSICAL THERAPY | Age: 34
Setting detail: RECURRING SERIES
Discharge: HOME | End: 2024-05-12
Payer: COMMERCIAL

## 2024-05-09 ENCOUNTER — CLINICAL DOCUMENTATION (OUTPATIENT)
Dept: ORTHOPEDIC SURGERY | Age: 34
End: 2024-05-09

## 2024-05-09 PROCEDURE — 97110 THERAPEUTIC EXERCISES: CPT

## 2024-05-09 ASSESSMENT — PAIN SCALES - GENERAL: PAINLEVEL_OUTOF10: 0

## 2024-05-09 NOTE — PROGRESS NOTES
reps x 10 sec hold   X 10 reps x 10 sec hold each    Nu step  Level 4 x 10 mins for range of motion and endurance training  5 minutes for ROM and muscular endurance  X 10 mins level 3 for range of motion and endurance training    Calf stretch Slantboard 4x30 sec hold each  Slantboard 4x30 sec hold each    Sit to stand  2x10 reps   X 10 reps    Long arc quads  2x10 reps  3x12, tactile assistance for terminal extension Seated x 20 reps    Standing heel toe raises  X 20 reps at bar BLE's  2x15 BLE X 20 reps BLE's at bar    Standing hip flexion X 20 reps BLE  BLE's x 20 reps    Standing hamstring curls  X 20 reps BLE's  BLE's x 20 reps    Standing hip abduction  2x10 reps each LE   2x10 reps BLE's    Supine short arc quads  Supine with foam roller x 30 reps   Supine with foam roller x 20 reps    Ankle pumps X 30 reps seated   Supine x 30 reps    [employer] Access Code: --    Time spent with patient reviewing proper muscle recruitment and technique with exercises.     MANUAL THERAPY: (0 minutes):   Joint mobilization and Soft tissue mobilization was utilized and necessary because of the patient's restricted joint motion, painful spasm, loss of articular motion, and restricted motion of soft tissue.   None    MODALITIES: (0 minutes): Pt will elevate and ice at home        Pt. Received ice pack with elevation to decrease pain and potential swelling       HEP: As above; handouts given to patient for all exercises.     Treatment/Session Summary:    Treatment Assessment:   Patient demonstrates tolerance to full WB when walking. She may stop using her crutches to continue to improve and normalize her gait. She still demonstrates quad weakness and difficulty with terminal extension. She requires frequent cueing to stand upright.   Communication/Consultation:  None today  Equipment provided today:  HEP  Recommendations/Intent for next treatment session: Next visit will focus on advancements to more challenging activities to

## 2024-05-14 ENCOUNTER — HOSPITAL ENCOUNTER (OUTPATIENT)
Dept: PHYSICAL THERAPY | Age: 34
Setting detail: RECURRING SERIES
Discharge: HOME | End: 2024-05-17
Payer: COMMERCIAL

## 2024-05-14 PROCEDURE — 97110 THERAPEUTIC EXERCISES: CPT

## 2024-05-14 ASSESSMENT — PAIN SCALES - GENERAL: PAINLEVEL_OUTOF10: 0

## 2024-05-14 NOTE — PROGRESS NOTES
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    ASSESSMENT:DATE: 2024    PROGRESS: Cydney MORRIS knee ROM is progressing well and can tolerate open chain knee flexion up to 120°; knee extension is currently at +2° passively. She is struggling achieving terminal extension. She requires frequent cueing for a normal gait pattern. We d/c her crutches earlier this week and has done well.    Please advise any specific activities or exercises you would like patient to perform or avoid.     RECOMMENDATIONS: Continue therapy 2 times a week through certification period or until goals are met.    Thank you for this referral, and please do not hesitate to contact me at the number listed above if you have any questions.    Seth Montelongo, PT, DPT     
CHAIM Montelongo         Charge Capture  [facility] Portal  Appt Desk     Future Appointments   Date Time Provider Department Center   5/16/2024  1:30 PM Lynda Miller, PTA [facility]   5/20/2024 10:50 AM Lyle Asher PA [facility]   5/21/2024  1:30 PM Seth Montelongo, PT [facility]   5/23/2024  1:30 PM Seth Montelongo, PT [facility]   5/28/2024  2:30 PM Seth Montelongo, PT [facility]   5/30/2024  1:30 PM Lynda Miller, PTA [facility]   6/4/2024  1:30 PM Lynda Miller, PTA [facility]   6/6/2024  1:30 PM Seth Montelongo, PT [facility]   7/23/2024  2:30 PM Lizzy Suarez MD [facility]

## 2024-05-16 ENCOUNTER — HOSPITAL ENCOUNTER (OUTPATIENT)
Dept: PHYSICAL THERAPY | Age: 34
Setting detail: RECURRING SERIES
Discharge: HOME | End: 2024-05-19
Payer: COMMERCIAL

## 2024-05-16 PROCEDURE — 97110 THERAPEUTIC EXERCISES: CPT

## 2024-05-16 PROCEDURE — 97140 MANUAL THERAPY 1/> REGIONS: CPT

## 2024-05-16 NOTE — PROGRESS NOTES
PT [facility]   5/30/2024  1:30 PM Lynda Miller, PTA [facility]   6/4/2024  1:30 PM Lynda Miller, PTA [facility]   6/6/2024  1:30 PM Seth Montelongo, PT [facility]   7/23/2024  2:30 PM Lizzy Suarez MD [facility]

## 2024-05-20 ENCOUNTER — CLINICAL DOCUMENTATION (OUTPATIENT)
Dept: ORTHOPEDIC SURGERY | Age: 34
End: 2024-05-20

## 2024-05-20 ENCOUNTER — OFFICE VISIT (OUTPATIENT)
Dept: ORTHOPEDIC SURGERY | Age: 34
End: 2024-05-20

## 2024-05-20 DIAGNOSIS — Z09 SURGERY FOLLOW-UP: Primary | ICD-10-CM

## 2024-05-20 DIAGNOSIS — M22.2X1 PATELLOFEMORAL PAIN SYNDROME OF RIGHT KNEE: ICD-10-CM

## 2024-05-20 PROCEDURE — 99024 POSTOP FOLLOW-UP VISIT: CPT | Performed by: PHYSICIAN ASSISTANT

## 2024-05-21 ENCOUNTER — HOSPITAL ENCOUNTER (OUTPATIENT)
Dept: PHYSICAL THERAPY | Age: 34
Setting detail: RECURRING SERIES
Discharge: HOME | End: 2024-05-24
Payer: COMMERCIAL

## 2024-05-21 PROCEDURE — 97110 THERAPEUTIC EXERCISES: CPT

## 2024-05-21 ASSESSMENT — PAIN SCALES - GENERAL: PAINLEVEL_OUTOF10: 0

## 2024-05-21 NOTE — PROGRESS NOTES
Cydney Scottntnaz Chester  : 1990  Primary: [employer]  Secondary:  [facility] [place]  [address]  [facility] [address]  Phone: [phone]  Fax: [phone] Plan Frequency: 2 sessions per week for 90 days (with the potential to reduce to 1 session per week pending patient presentation or availability)    Plan of Care/Certification Expiration Date: 24        Plan of Care/Certification Expiration Date:  Plan of Care/Certification Expiration Date: 24    Frequency/Duration: Plan Frequency: 2 sessions per week for 90 days (with the potential to reduce to 1 session per week pending patient presentation or availability)      Time In/Out:   Time In: 1333  Time Out: 1430      PT Visit Info:         Visit Count:  8                OUTPATIENT PHYSICAL THERAPY:             Progress Report 2024               Episode (S/p R Knee Surgery)         Treatment Diagnosis:     No data found  Medical/Referring Diagnosis:    Patellofemoral pain syndrome of right knee [M22.2X1]  Osteoarthritis of right knee, unspecified osteoarthritis type [M17.11]  Bone lesion [M89.9]  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  Restrictions/Precautions:    Post Surgical Precautions: R knee surgery      Medications Last Reviewed:  2024          OBJECTIVE         ROM in ° Date:  2024   KNEE ROM (TESTED IN SUPINE)    RIGHT LEFT   Flexion 70°, staying in a comfortable range 145°   Extension -10° from full extension +5     PALPATION/TONE/TISSUE TEXTURE:   Date:   2024   SOFT TISSUE:   Quads No complaints   Hamstrings No complaints   TFL/IT band NT   Gastroc/soleus/post tib Unremarkable     Skin integrity   Healing well     BALANCE Date: 2024   Right NT

## 2024-05-21 NOTE — PROGRESS NOTES
Cydney Fransisca Chester  : 1990  Primary: [employer] ([employer]  Secondary:  [facility] [place]  Ana ZHENG  [facility] [address]  Phone: [phone]  Fax: [phone] Plan Frequency: 2 sessions per week for 90 days (with the potential to reduce to 1 session per week pending patient presentation or availability)  Plan of Care/Certification Expiration Date: 24        Plan of Care/Certification Expiration Date:  Plan of Care/Certification Expiration Date: 24    Frequency/Duration: Plan Frequency: 2 sessions per week for 90 days (with the potential to reduce to 1 session per week pending patient presentation or availability)      Time In/Out:   Time In: 1333  Time Out: 1430      PT Visit Info:         Visit Count:  8    OUTPATIENT PHYSICAL THERAPY:   Treatment Note 2024       Episode  (S/p R Knee Surgery)               Treatment Diagnosis:    Right knee pain, unspecified chronicity  Stiffness of right knee, not elsewhere classified  Difficulty in walking, not elsewhere classified  Medical/Referring Diagnosis:    Patellofemoral pain syndrome of right knee [M22.2X1]  Osteoarthritis of right knee, unspecified osteoarthritis type [M17.11]  Bone lesion [M89.9]  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  Restrictions/Precautions:   Post Surgical Precautions: Knee surgery      Interventions Planned (Treatment may consist of any combination of the following):     See Assessment Note    Subjective Comments:   Patient reports that she is progressing but still feels weakness.   Initial Pain Level::     0/10  Post Session Pain Level:       0/10  Medications Last Reviewed:  2024  Updated Objective Findings:   See Progress Note      ROM in

## 2024-05-23 ENCOUNTER — HOSPITAL ENCOUNTER (OUTPATIENT)
Dept: PHYSICAL THERAPY | Age: 34
Setting detail: RECURRING SERIES
Discharge: HOME | End: 2024-05-26
Payer: COMMERCIAL

## 2024-05-23 PROCEDURE — 97110 THERAPEUTIC EXERCISES: CPT

## 2024-05-23 ASSESSMENT — PAIN SCALES - GENERAL: PAINLEVEL_OUTOF10: 3

## 2024-05-23 NOTE — PROGRESS NOTES
Name: Cydney Chester  YOB: 1990  Gender: female  MRN: [id number]    CC:   Chief Complaint   Patient presents with    Follow-up     Recheck s/p right knee scope lateral release, removal of loose bodies, chondroplasty and extensive synovectomy  DOS 4/18/24     Right Knee Arthroscopy Lateral Release, Removal Of Loose Bodies - Right and RIGHT KNEE ARTHROSCOPY CHONDROPLASTY and extensive synovectomy. - Right  4/18/2024    HPI:   Patient doing well 6 weeks out from knee arthroscopy. They had right knee arthroscopy ,removal of loose bodies, chondroplasty and extensive synovectomy.  Discussed seeing Dr. Galvez or Liliana which she has an appointment set up for this.  The patient notes continued knee pain.  Denies numbness and tingling.  Notes inability to work at her job as she is a  requiring kneeling, squatting, crawling etc. She expressed concerns as she is having hardships financially on disability as well.     Review of Systems  Noncontributory     PE: they have good active motion, TTP medial joint line.  Negative TTP lateral joint line.  Good quad tone and definition.  No tenderness.  Calf is soft.  Trace effusion.     A/Plan:     ICD-10-CM    1. Surgery follow-up  Z09       2. Patellofemoral pain syndrome of right knee  M22.2X1         They will continue with a home exercise program.    OTC medication PRN.   I had an extensive discussion with the patient and family member at bedside about her condition.  The appointment with Dr. Galvez is imperative that she follows up on this.  We discussed pain control options including oral medication, injections, nerve block, etc.  Discussed the potential for injections in the future if clinically indicated.  We also reviewed the potential for the patient to switch jobss altogether in order to accommodate her knee as she may have intermittent flare ups. FU six weeks with Dr. Perez and after appointment with Dr. Galvez.     Return in about 6 weeks

## 2024-05-23 NOTE — PROGRESS NOTES
Cydney Chester  : 1990  Primary: [employer])  Secondary:  [facility] [place]  Ana ZHENG  [facility] [address]  Phone: [phone]  Fax: [phone] Plan Frequency: 2 sessions per week for 90 days (with the potential to reduce to 1 session per week pending patient presentation or availability)  Plan of Care/Certification Expiration Date: 24        Plan of Care/Certification Expiration Date:  Plan of Care/Certification Expiration Date: 24    Frequency/Duration: Plan Frequency: 2 sessions per week for 90 days (with the potential to reduce to 1 session per week pending patient presentation or availability)      Time In/Out:   Time In: 1332  Time Out: 1445      PT Visit Info:         Visit Count:  9    OUTPATIENT PHYSICAL THERAPY:   Treatment Note 2024       Episode  (S/p R Knee Surgery)               Treatment Diagnosis:    Right knee pain, unspecified chronicity  Stiffness of right knee, not elsewhere classified  Difficulty in walking, not elsewhere classified  Medical/Referring Diagnosis:    Patellofemoral pain syndrome of right knee [M22.2X1]  Osteoarthritis of right knee, unspecified osteoarthritis type [M17.11]  Bone lesion [M89.9]  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  Restrictions/Precautions:   Post Surgical Precautions: Knee surgery      Interventions Planned (Treatment may consist of any combination of the following):     See Assessment Note    Subjective Comments:   Patient reports pain at the front part of her knee.    Initial Pain Level::     3/10  Post Session Pain Level:        /10  Medications Last Reviewed:  2024  Updated Objective Findings:   Quad weakness with terminal

## 2024-05-28 ENCOUNTER — HOSPITAL ENCOUNTER (OUTPATIENT)
Dept: PHYSICAL THERAPY | Age: 34
Setting detail: RECURRING SERIES
Discharge: HOME | End: 2024-05-31
Payer: COMMERCIAL

## 2024-05-28 PROCEDURE — 97110 THERAPEUTIC EXERCISES: CPT

## 2024-05-28 ASSESSMENT — PAIN SCALES - GENERAL: PAINLEVEL_OUTOF10: 0

## 2024-05-28 NOTE — PROGRESS NOTES
Patient demonstrates improvement to gait with only mild antalgic gait      Treatment   THERAPEUTIC EXERCISE: (54 minutes):    Exercises per grid below to improve mobility, strength, and balance.  Required moderate visual, verbal, manual, and tactile cues to promote proper body alignment, promote proper body posture, promote proper body mechanics, and promote proper body breathing techniques.  Progressed resistance, range, repetitions, and complexity of movement as indicated.   Date:  24 Date:  24 Date  24   Activity/Exercise Parameters Parameters    Quad Sets Knee in about 20 deg of knee flexion, supported by foam roll,7 minutes, w/ Russian E-stim Knee in about 20 deg of knee flexion, supported by foam roll, 3 x 10 Knee in about 20 deg of knee flexion, supported by foam roll, 3 x 10   Gait Training 2 lap around gym, worked on increased knee flexion in swing phase  1 lap around gym, worked on increased knee flexion in swing phase   Straight leg raises  Knee in about 20 deg of knee flexion, supported by foam roll w/ assistance  - -   Hip abduction  Side-lying, 3 x12, RLE Side-lying, 3x12, RLE Side-lying, 3 x10, RLE   Hip adduction       Short arc quads  3x15, leg on roller 5 minutes : with Nauruan Estim    Nu step  5 minutes level 3 for ROM and muscular endurance  7 minutes level 3 for ROM and muscular endurance  5 min, L3 for ROM and endurance   Calf stretch 2x60\" slant board  60 sec   Sit to stand    -   Long arc quads   3x12, 0# 3x12, 0#   Knee Flexion Step tap, 8\" step, 10x, 3 sets  Step tap, 8\" step, 10x, 3 sets   TKE Orange thick band, w/ partial double leg squat, 10x, 3 sets  Orange thick band, w/ partial double leg squat, 10x, 3 sets   Shuttle Double leg, 0-60 deg knee bend, 125lbs, 10x, 3 sets  Single le lbs, 10x, 3 sets Double leg, 0-70 deg knee bend, 125lbs, 10x, 3 sets  Single le lbs, 10x, 3 sets Single le lbs, 10x, 3 sets   Heel Raises   2x15, DL   [employer] Access Code:

## 2024-05-30 ENCOUNTER — CLINICAL DOCUMENTATION (OUTPATIENT)
Dept: ORTHOPEDIC SURGERY | Age: 34
End: 2024-05-30

## 2024-05-30 ENCOUNTER — HOSPITAL ENCOUNTER (OUTPATIENT)
Dept: PHYSICAL THERAPY | Age: 34
Setting detail: RECURRING SERIES
End: 2024-05-30
Payer: COMMERCIAL

## 2024-05-30 PROCEDURE — 97110 THERAPEUTIC EXERCISES: CPT

## 2024-05-30 ASSESSMENT — PAIN SCALES - GENERAL: PAINLEVEL_OUTOF10: 0

## 2024-05-30 NOTE — PROGRESS NOTES
Cydney Fransisca Chester  : 1990  Primary: [employer]  Secondary:  [facility] [place]  Ana [address]HENG  [facility] [address]  Phone: [phone]  Fax: [phone] Plan Frequency: 2 sessions per week for 90 days (with the potential to reduce to 1 session per week pending patient presentation or availability)  Plan of Care/Certification Expiration Date: 24        Plan of Care/Certification Expiration Date:  Plan of Care/Certification Expiration Date: 24    Frequency/Duration: Plan Frequency: 2 sessions per week for 90 days (with the potential to reduce to 1 session per week pending patient presentation or availability)      Time In/Out:   Time In: 1330  Time Out: 1440      PT Visit Info:         Visit Count:  11    OUTPATIENT PHYSICAL THERAPY:   Treatment Note 2024       Episode  (S/p R Knee Surgery)               Treatment Diagnosis:    Right knee pain, unspecified chronicity  Stiffness of right knee, not elsewhere classified  Difficulty in walking, not elsewhere classified  Medical/Referring Diagnosis:    Patellofemoral pain syndrome of right knee [M22.2X1]  Osteoarthritis of right knee, unspecified osteoarthritis type [M17.11]  Bone lesion [M89.9]  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  Restrictions/Precautions:   Post Surgical Precautions: Knee surgery      Interventions Planned (Treatment may consist of any combination of the following):     See Assessment Note    Subjective Comments:   Patient reported   Initial Pain Level::     0/10  Post Session Pain Level:       0/10  Medications Last Reviewed:  2024  Updated Objective Findings:   Patient continues to demonstrate improved gait and independence with  bar BLE's   2x15, DL   [employer] Access Code: --    Time spent with patient reviewing proper muscle recruitment and technique with exercises.     MANUAL THERAPY: (00 minutes):   Joint mobilization and Soft tissue mobilization was utilized and necessary because of the patient's restricted joint motion, painful spasm, loss of articular motion, and restricted motion of soft tissue.   None today    MODALITIES: (10 minutes):        Pt. Received ice with elevation to right knee.       HEP: As above; handouts given to patient for all exercises.     Treatment/Session Summary:    Treatment Assessment:   Patient demonstrated independence with negation up and down stairs with bilateral handrails.      Communication/Consultation:  None today  Equipment provided today:  None  Recommendations/Intent for next treatment session: Next visit will focus on advancements to more challenging activities to include progressing strength, functional mobility, pain tolerance, and ROM as tolerated and within surgeon's guidelines.     >Total Treatment Billable Duration:  54 minutes   Time In: 1330  Time Out: 1440    LYNDA MILLER PTA         [employer] Portal  Appt Desk     Future Appointments   Date Time Provider Department Center   6/4/2024  1:30 PM Lynda Miller PTA [facility]   6/6/2024  1:30 PM Seth Montelongo PT [facility]   7/10/2024 10:15 AM DEEDEE Perez MD [facility]   7/23/2024  2:30 PM Lizzy Suarez MD [facility]

## 2024-06-04 ENCOUNTER — HOSPITAL ENCOUNTER (OUTPATIENT)
Dept: PHYSICAL THERAPY | Age: 34
Setting detail: RECURRING SERIES
Discharge: HOME | End: 2024-06-07
Payer: COMMERCIAL

## 2024-06-04 PROCEDURE — 97110 THERAPEUTIC EXERCISES: CPT

## 2024-06-04 ASSESSMENT — PAIN SCALES - GENERAL: PAINLEVEL_OUTOF10: 0

## 2024-06-04 NOTE — PROGRESS NOTES
0-128 degrees.  Compliant with all exercises and excellent balance. Left LE gross strength 4/5.      Treatment   THERAPEUTIC EXERCISE: (53 minutes):    Exercises per grid below to improve mobility, strength, and balance.  Required moderate visual, verbal, manual, and tactile cues to promote proper body alignment, promote proper body posture, promote proper body mechanics, and promote proper body breathing techniques.  Progressed resistance, range, repetitions, and complexity of movement as indicated.   Date:  5/30/24 Date:  6/4//24 Date  5/28/24   Activity/Exercise Parameters Parameters    Quad Sets Knee in about 20 degree flexion, supported by foam roll 3x10  Knee in about 20 deg of knee flexion, supported by foam roll, 3 x 10 Knee in about 20 deg of knee flexion, supported by foam roll, 3 x 10   Gait Training 2 lap around gym, worked on increased knee flexion in swing phase 4 laps around gym worked on increased knee flexion in swing phase  1 lap around gym, worked on increased knee flexion in swing phase   Straight leg raises  Knee in about 20 deg of knee flexion, supported by foam roll w/ assistance  -x 20 reps no support needed.  -   Hip abduction  Side-lying, 3 x12, RLE Side-lying, 3x12, RLE Side-lying, 3 x10, RLE   Hip adduction  ------     Short arc quads ----- ------ 5 minutes : with Brazilian Estim    Nu step  Not today  X 10 mins  level 4 for ROM and muscular endurance  5 min, L3 for ROM and endurance   Air dyne  X 10 mins seat #2 for range of motion and endurance training X 10 mins seat #2 for range of motion and endurance training    Calf stretch 2x60\" slant board 4x30 sec hold on slantboard 60 sec   Sit to stand  2x10 reps from plinth in gym  2x10 reps from plinth in gym  -   Long arc quads  X 45 reps 5 sec hold each bilaterally  3x15 reps 5 sec hold each  3x12, 0#   Knee Flexion Step tap, 8\" step, 10x, 3 sets 6 inch step RLE lead going up x 10 reps  Step tap, 8\" step, 10x, 3 sets   TKE Orange thick band,

## 2024-06-06 ENCOUNTER — HOSPITAL ENCOUNTER (OUTPATIENT)
Dept: PHYSICAL THERAPY | Age: 34
Setting detail: RECURRING SERIES
Discharge: HOME | End: 2024-06-09
Payer: COMMERCIAL

## 2024-06-06 DIAGNOSIS — M25.561 RIGHT KNEE PAIN, UNSPECIFIED CHRONICITY: Primary | ICD-10-CM

## 2024-06-06 DIAGNOSIS — M25.661 STIFFNESS OF RIGHT KNEE, NOT ELSEWHERE CLASSIFIED: ICD-10-CM

## 2024-06-06 DIAGNOSIS — R26.2 DIFFICULTY IN WALKING, NOT ELSEWHERE CLASSIFIED: ICD-10-CM

## 2024-06-06 PROCEDURE — 97110 THERAPEUTIC EXERCISES: CPT

## 2024-06-06 ASSESSMENT — PAIN SCALES - GENERAL: PAINLEVEL_OUTOF10: 0

## 2024-06-06 NOTE — THERAPY RECERTIFICATION
Decreased Balance, Decreased Body Mechanics, Difficulty Sleeping, Decreased Activity Tolerance/Endurance*, Decreased Pacing Skills, and Edema/Girth   Therapy Prognosis:   Good     Initial Assessment Complexity:   Low Complexity       PLAN   Effective Dates: 4/22/2024 TO Plan of Care/Certification Expiration Date: 07/22/24     Frequency/Duration: Plan Frequency: 2 sessions per week for 90 days (with the potential to reduce to 1 session per week pending patient presentation or availability)      Interventions Planned (Treatment may consist of any combination of the following):    Balance Training, Functional Mobility Training, Home Exercise Program (HEP), Manual Therapy, Neuromuscular Re-education/Strengthening, Pain Management, Range of Motion (ROM), Return to Work-Related Activiity, Therapeutic Activites, Therapeutic Exercise/Strengthening, Dry Needling, and Wound Care   GOALS: (Goals have been discussed and agreed upon with patient.)  Short Term Goals 4 weeks   Cydney Chester  will be independent with HEP to promote self-management of symptoms. GOAL MET 5/21/2024    Cydney Chester  will demonstrate R knee extension >= +5° to demonstrate improvement to functional mobility and flexibility ONGOING 6/6/2024   Cydney Chester  will tolerate manual therapy/joint mobilizations to increase knee flexion ROM so patient can ambulate stairs and walk with a more normalized gait pattern. GOAL MET 5/21/2024    Cydney Chester  will participate in static and dynamic balance activities for 5 minutes to help improve proprioception and decrease risk of falls. GOAL MET 6/6/2024       Long Term Goals 12 weeks  Cydney Chester  will be able to perform sit to stand transfers independently with increased knee flexion and decreased use of upper extremities. ONGOING 6/6/2024   Cydney Chester  will ascend/descend 12 steps with reciprocal gait pattern and rail. GOAL MET 6/6/2024    Cydney Gongora

## 2024-06-06 NOTE — PROGRESS NOTES
Cydney Scottntnaz Chester  : 1990  Primary: [employer]  Secondary:  [facility] [place]  Ana ZHENG  [facility] [address]  Phone: [phone]  Fax: [phone] Plan Frequency: 2 sessions per week for 90 days (with the potential to reduce to 1 session per week pending patient presentation or availability)  Plan of Care/Certification Expiration Date: 24        Plan of Care/Certification Expiration Date:  Plan of Care/Certification Expiration Date: 24    Frequency/Duration: Plan Frequency: 2 sessions per week for 90 days (with the potential to reduce to 1 session per week pending patient presentation or availability)      Time In/Out:   Time In: 1332  Time Out: 1430      PT Visit Info:         Visit Count:  13    OUTPATIENT PHYSICAL THERAPY:   Treatment Note 2024       Episode  (S/p R Knee Surgery)               Treatment Diagnosis:    Right knee pain, unspecified chronicity  Stiffness of right knee, not elsewhere classified  Difficulty in walking, not elsewhere classified  Medical/Referring Diagnosis:    Patellofemoral pain syndrome of right knee [M22.2X1]  Osteoarthritis of right knee, unspecified osteoarthritis type [M17.11]  Bone lesion [M89.9]  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  Restrictions/Precautions:   Post Surgical Precautions: Knee surgery      Interventions Planned (Treatment may consist of any combination of the following):     See Assessment Note    Subjective Comments:   Patient reported no pain and progressing well with therapy.   Initial Pain Level::     0/10  Post Session Pain Level:       0/10  Medications Last Reviewed:  2024  Updated Objective Findings:  See Recertification Note from today

## 2024-06-17 ENCOUNTER — TELEPHONE (OUTPATIENT)
Dept: ORTHOPEDIC SURGERY | Age: 34
End: 2024-06-17

## 2024-06-17 NOTE — TELEPHONE ENCOUNTER
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 She is asking for a return call to discuss.

## 2024-06-20 ENCOUNTER — HOSPITAL ENCOUNTER (OUTPATIENT)
Dept: PHYSICAL THERAPY | Age: 34
Setting detail: RECURRING SERIES
Discharge: HOME | End: 2024-06-23
Payer: COMMERCIAL

## 2024-06-20 ENCOUNTER — CLINICAL DOCUMENTATION (OUTPATIENT)
Dept: ORTHOPEDIC SURGERY | Age: 34
End: 2024-06-20

## 2024-06-20 PROCEDURE — 97110 THERAPEUTIC EXERCISES: CPT

## 2024-06-20 ASSESSMENT — PAIN SCALES - GENERAL: PAINLEVEL_OUTOF10: 0

## 2024-06-20 NOTE — PROGRESS NOTES
3-0-129 degrees  Treatment   THERAPEUTIC EXERCISE: (57 minutes):    Exercises per grid below to improve mobility, strength, and balance.  Required moderate visual, verbal, manual, and tactile cues to promote proper body alignment, promote proper body posture, promote proper body mechanics, and promote proper body breathing techniques.  Progressed resistance, range, repetitions, and complexity of movement as indicated.   Date:  2024 Date:  24 Date  24   Activity/Exercise Parameters Parameters    Quad Sets 10 x 10 seconds Knee in about 20 deg of knee flexion, supported by foam roll, 3 x 10 --   Gait Training 2 lap around gym, worked on increased knee flexion in swing phase 4 laps around gym worked on increased knee flexion in swing phase  1 lap around gym, worked on increased knee flexion in swing phase   Straight leg raises  3 x 10, cuing for terminal knee extension -x 20 reps no support needed.  3x10, cueing for terminal extension   Hip abduction  Sidelying: 3 x 10 with PT assist for positioning     Standing: 3 x 10 each Side-lying, 3x12, RLE Standing: 2x10 each, cone for visual cueing    Side steps: 2x10 steps each, red   Short arc quads 2 lbs, 2 x 20 ------    Nu step  Level 3, x 10 minutes for ROM and endurance X 10 mins  level 4 for ROM and muscular endurance  5 min, L3 for ROM and endurance   Air dyne  --- X 10 mins seat #2 for range of motion and endurance training --   Calf stretch Slant board, 2 x 60 seconds 4x30 sec hold on slantboard 60 seconds, slant board   Sit to stand  Low plinth, 3 x 10 2x10 reps from plinth in gym  3x10 from plinth   Long arc quads  2 lb, 3 x 10 R 3x15 reps 5 sec hold each  3x15, 0#   Knee Flexion --- 6 inch step RLE lead going up x 10 reps  --   TKE Thick orange band with partial double leg squat, 3 x 10 Thick orange band with partial double leg squat, 3x10 reps  --   Shuttle --- Double leg, 0-127deg knee bend, 125lbs, 10x, 3 sets  Single le lbs, 10x, 3 sets --

## 2024-06-21 ENCOUNTER — HOSPITAL ENCOUNTER (OUTPATIENT)
Dept: PHYSICAL THERAPY | Age: 34
Setting detail: RECURRING SERIES
Discharge: HOME | End: 2024-06-24
Payer: COMMERCIAL

## 2024-06-21 PROCEDURE — 97110 THERAPEUTIC EXERCISES: CPT

## 2024-06-21 ASSESSMENT — PAIN SCALES - GENERAL: PAINLEVEL_OUTOF10: 0

## 2024-06-21 NOTE — PROGRESS NOTES
Cydney Fransisca Chester  : 1990  Primary: [employer]  Secondary:  [facility] [place] @ [place]  [address]  [address]  [facility] [address]  Phone: [phone]  Fax: [phone] Plan Frequency: 2 sessions per week for 90 days (with the potential to reduce to 1 session per week pending patient presentation or availability)  Plan of Care/Certification Expiration Date: 24        Plan of Care/Certification Expiration Date:  Plan of Care/Certification Expiration Date: 24    Frequency/Duration: Plan Frequency: 2 sessions per week for 90 days (with the potential to reduce to 1 session per week pending patient presentation or availability)      Time In/Out:   Time In: 0900  Time Out: 1000      PT Visit Info:         Visit Count:  15    OUTPATIENT PHYSICAL THERAPY:   Treatment Note 2024       Episode  (S/p R Knee Surgery)               Treatment Diagnosis:    Right knee pain, unspecified chronicity  Stiffness of right knee, not elsewhere classified  Difficulty in walking, not elsewhere classified  Medical/Referring Diagnosis:    Patellofemoral pain syndrome of right knee [M22.2X1]  Osteoarthritis of right knee, unspecified osteoarthritis type [M17.11]  Bone lesion [M89.9]  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  Restrictions/Precautions:   Post Surgical Precautions: Knee surgery      Interventions Planned (Treatment may consist of any combination of the following):     See Assessment Note    Subjective Comments:   Patient reported she is doing well with no complaints and no pain.   Initial Pain Level::     0/10  Post Session Pain Level:       0/10  Medications Last Reviewed:  2024  Updated Objective Findings:   R knee ROM:  0-128 degrees  Treatment

## 2024-06-25 ENCOUNTER — APPOINTMENT (OUTPATIENT)
Dept: PHYSICAL THERAPY | Age: 34
End: 2024-06-25
Payer: COMMERCIAL

## 2024-06-27 ENCOUNTER — HOSPITAL ENCOUNTER (OUTPATIENT)
Dept: PHYSICAL THERAPY | Age: 34
Setting detail: RECURRING SERIES
Discharge: HOME | End: 2024-06-30
Payer: COMMERCIAL

## 2024-06-27 PROCEDURE — 97110 THERAPEUTIC EXERCISES: CPT

## 2024-06-27 ASSESSMENT — PAIN SCALES - GENERAL: PAINLEVEL_OUTOF10: 0

## 2024-06-27 NOTE — PROGRESS NOTES
Cydney Scottntnaz Chester  : 1990  Primary: [employer]  Secondary:  [facility] [place]  Ana ZHENG  [facility] [address]  Phone: [phone]  Fax: [phone] Plan Frequency: 2 sessions per week for 90 days (with the potential to reduce to 1 session per week pending patient presentation or availability)  Plan of Care/Certification Expiration Date: 24        Plan of Care/Certification Expiration Date:  Plan of Care/Certification Expiration Date: 24    Frequency/Duration: Plan Frequency: 2 sessions per week for 90 days (with the potential to reduce to 1 session per week pending patient presentation or availability)      Time In/Out:   Time In: 1100  Time Out: 1200      PT Visit Info:         Visit Count:  16    OUTPATIENT PHYSICAL THERAPY:   Treatment Note 2024       Episode  (S/p R Knee Surgery)               Treatment Diagnosis:    Right knee pain, unspecified chronicity  Stiffness of right knee, not elsewhere classified  Difficulty in walking, not elsewhere classified  Medical/Referring Diagnosis:    Patellofemoral pain syndrome of right knee [M22.2X1]  Osteoarthritis of right knee, unspecified osteoarthritis type [M17.11]  Bone lesion [M89.9]  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  Restrictions/Precautions:   Post Surgical Precautions: Knee surgery      Interventions Planned (Treatment may consist of any combination of the following):     See Assessment Note    Subjective Comments:   Patient denies pain but states she wants to improve her balance  Initial Pain Level::     0/10  Post Session Pain Level:       0/10  Medications Last Reviewed:  2024  Updated Objective Findings:   R knee ROM:  0-128 degrees  Treatment

## 2024-07-01 ENCOUNTER — APPOINTMENT (OUTPATIENT)
Dept: PHYSICAL THERAPY | Age: 34
End: 2024-07-01
Payer: COMMERCIAL

## 2024-07-03 ENCOUNTER — HOSPITAL ENCOUNTER (OUTPATIENT)
Dept: PHYSICAL THERAPY | Age: 34
Setting detail: RECURRING SERIES
Discharge: HOME | End: 2024-07-06
Payer: COMMERCIAL

## 2024-07-03 PROCEDURE — 97110 THERAPEUTIC EXERCISES: CPT

## 2024-07-03 ASSESSMENT — PAIN SCALES - GENERAL: PAINLEVEL_OUTOF10: 0

## 2024-07-03 NOTE — PROGRESS NOTES
Cydney Chester  : 1990  Primary: [employer]  Secondary:  [facility]  Ana ZHENG  [facility] [address]  Phone: [phone]  Fax: [phone] Plan Frequency: 2 sessions per week for 90 days (with the potential to reduce to 1 session per week pending patient presentation or availability)  Plan of Care/Certification Expiration Date: 24        Plan of Care/Certification Expiration Date:  Plan of Care/Certification Expiration Date: 24    Frequency/Duration: Plan Frequency: 2 sessions per week for 90 days (with the potential to reduce to 1 session per week pending patient presentation or availability)      Time In/Out:   Time In: 1048  Time Out: 1145      PT Visit Info:         Visit Count:  17    OUTPATIENT PHYSICAL THERAPY:   Treatment Note 7/3/2024       Episode  (S/p R Knee Surgery)               Treatment Diagnosis:    Right knee pain, unspecified chronicity  Stiffness of right knee, not elsewhere classified  Difficulty in walking, not elsewhere classified  Medical/Referring Diagnosis:    Patellofemoral pain syndrome of right knee [M22.2X1]  Osteoarthritis of right knee, unspecified osteoarthritis type [M17.11]  Bone lesion [M89.9]  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  Restrictions/Precautions:   Post Surgical Precautions: Knee surgery      Interventions Planned (Treatment may consist of any combination of the following):     See Assessment Note    Subjective Comments:   Patient arrives feeling \"good\". She was able to squat down and grab her laundry without complaints.   Initial Pain Level::     0/10  Post Session Pain Level:       0/10  Medications Last Reviewed:  7/3/2024  Updated Objective Findings:   SLS: now >3\"

## 2024-07-08 ENCOUNTER — APPOINTMENT (OUTPATIENT)
Dept: PHYSICAL THERAPY | Age: 34
End: 2024-07-08
Payer: COMMERCIAL

## 2024-07-10 ENCOUNTER — CLINICAL DOCUMENTATION (OUTPATIENT)
Dept: ORTHOPEDIC SURGERY | Age: 34
End: 2024-07-10

## 2024-07-10 ENCOUNTER — OFFICE VISIT (OUTPATIENT)
Dept: ORTHOPEDIC SURGERY | Age: 34
End: 2024-07-10

## 2024-07-10 DIAGNOSIS — Z09 SURGERY FOLLOW-UP: Primary | ICD-10-CM

## 2024-07-10 DIAGNOSIS — M22.2X1 PATELLOFEMORAL PAIN SYNDROME OF RIGHT KNEE: ICD-10-CM

## 2024-07-10 PROCEDURE — 99024 POSTOP FOLLOW-UP VISIT: CPT | Performed by: ORTHOPAEDIC SURGERY

## 2024-07-10 NOTE — PROGRESS NOTES
Name: Cydney Chester  YOB: 1990  Gender: female  MRN: [id number]    CC:   Chief Complaint   Patient presents with    Follow-up     Recheck S/P right knee scope lateral release, removal of loose bodies, chondroplasty and extensive synovectomy. DOS 4/18/24     Right Knee Arthroscopy Lateral Release, Removal Of Loose Bodies - Right and RIGHT KNEE ARTHROSCOPY CHONDROPLASTY and extensive synovectomy. - Right  4/18/2024    HPI:   Patient doing well 3 months out from knee arthroscopy. They had right knee arthroscopy ,removal of loose bodies, chondroplasty and extensive synovectomy.  The patient saw Dr. Ford on 6/14-24 and they discussed obtaining MRI with and without contrast for follow-up and further discussion.  That is apparently scheduled for later this evening.  Has good days and bad days.  Is working with therapy.  Inquiring about going out of work.    Review of Systems  Noncontributory     PE: they have good active motion, 0-130..  Quad tone and definition are improving.  Trace effusion.  Mild diffuse tenderness.  Calf is soft.    A/Plan:     ICD-10-CM    1. Surgery follow-up  Z09       2. Patellofemoral pain syndrome of right knee  M22.2X1         They will continue with physical therapy.  Use anti-inflammatories prn.  I would like to see her back after she has followed up with Dr. Ford  Work: May return to light duty.  No squatting or kneeling.  Limit walking to a single floor section.  Start with 6-hour shifts.  Return in about 6 weeks (around 8/21/2024).     Sacha Perez MD  07/10/24

## 2024-07-11 ENCOUNTER — APPOINTMENT (OUTPATIENT)
Dept: PHYSICAL THERAPY | Age: 34
End: 2024-07-11
Payer: COMMERCIAL

## 2024-07-11 ENCOUNTER — CLINICAL DOCUMENTATION (OUTPATIENT)
Dept: ORTHOPEDIC SURGERY | Age: 34
End: 2024-07-11

## 2024-07-15 ENCOUNTER — APPOINTMENT (OUTPATIENT)
Dept: PHYSICAL THERAPY | Age: 34
End: 2024-07-15
Payer: COMMERCIAL

## 2024-07-17 ENCOUNTER — APPOINTMENT (OUTPATIENT)
Dept: PHYSICAL THERAPY | Age: 34
End: 2024-07-17
Payer: COMMERCIAL

## 2024-07-22 ENCOUNTER — APPOINTMENT (OUTPATIENT)
Dept: PHYSICAL THERAPY | Age: 34
End: 2024-07-22
Payer: COMMERCIAL

## 2024-07-24 ENCOUNTER — CLINICAL DOCUMENTATION (OUTPATIENT)
Dept: ORTHOPEDIC SURGERY | Age: 34
End: 2024-07-24

## 2024-07-24 NOTE — PROGRESS NOTES
Received [facility] form which was a duplicate of what was faxed in to them 7-11-24 so refaxed form with requested note.

## 2024-08-21 ENCOUNTER — OFFICE VISIT (OUTPATIENT)
Dept: ORTHOPEDIC SURGERY | Age: 34
End: 2024-08-21
Payer: COMMERCIAL

## 2024-08-21 DIAGNOSIS — M17.11 OSTEOARTHRITIS OF RIGHT KNEE, UNSPECIFIED OSTEOARTHRITIS TYPE: ICD-10-CM

## 2024-08-21 DIAGNOSIS — Z09 SURGERY FOLLOW-UP: Primary | ICD-10-CM

## 2024-08-21 DIAGNOSIS — M22.2X1 PATELLOFEMORAL PAIN SYNDROME OF RIGHT KNEE: ICD-10-CM

## 2024-08-21 PROCEDURE — 99213 OFFICE O/P EST LOW 20 MIN: CPT | Performed by: ORTHOPAEDIC SURGERY

## 2024-08-21 NOTE — PROGRESS NOTES
Name: Cydney Chester  YOB: 1990  Gender: female  MRN: [id number]    CC:   Chief Complaint   Patient presents with    Follow-up     S/P right knee scope lateral release, removal of loose bodies, chondroplasty and extensive synovectomy DOS 4/18/24     Right Knee Arthroscopy Lateral Release, Removal Of Loose Bodies - Right and RIGHT KNEE ARTHROSCOPY CHONDROPLASTY and extensive synovectomy. - Right  4/18/2024    HPI:   Patient is four months s/p knee arthroscopy.  They had Right Knee Arthroscopy Lateral Release, Removal Of Loose Bodies - Right and RIGHT KNEE ARTHROSCOPY CHONDROPLASTY and extensive synovectomy. - Right. The patient recently saw Dr. Ford on 7-. She had an updated MRI which showed effusion and synovitis but no obvious focal mass. They discussed referral to medical oncology and she expressed a desire to continue with expectant management as she perceived her overall improvement. They discussed return in 6 months for new MRI and repeat eval. she had talked with Dr. Chen about referral to oncology but deferred.      Review of Systems  Noncontributory     PE: they have good active motion, 0-130.  Good quad tone and definition.  No tenderness.  Calf is soft.  No obvious effusion today.    A/Plan:     ICD-10-CM    1. Surgery follow-up  Z09       2. Patellofemoral pain syndrome of right knee  M22.2X1       3. Osteoarthritis of right knee, unspecified osteoarthritis type  M17.11         They will continue with a home exercise program.    Use anti-inflammatories prn.    Return if symptoms worsen or fail to improve.     Sacha Perez MD  08/21/24

## 2024-09-16 RX ORDER — MELOXICAM 15 MG/1
TABLET ORAL
Qty: 30 TABLET | Refills: 0 | OUTPATIENT
Start: 2024-09-16

## 2024-09-17 RX ORDER — MELOXICAM 15 MG/1
15 TABLET ORAL DAILY PRN
Qty: 30 TABLET | Refills: 2 | Status: SHIPPED | OUTPATIENT
Start: 2024-09-17

## 2024-09-18 RX ORDER — MELOXICAM 15 MG/1
TABLET ORAL
Qty: 30 TABLET | Refills: 0 | OUTPATIENT
Start: 2024-09-18

## 2024-09-18 ASSESSMENT — PATIENT HEALTH QUESTIONNAIRE - PHQ9
SUM OF ALL RESPONSES TO PHQ9 QUESTIONS 1 & 2: 0
1. LITTLE INTEREST OR PLEASURE IN DOING THINGS: NOT AT ALL
1. LITTLE INTEREST OR PLEASURE IN DOING THINGS: NOT AT ALL
2. FEELING DOWN, DEPRESSED OR HOPELESS: NOT AT ALL
SUM OF ALL RESPONSES TO PHQ9 QUESTIONS 1 & 2: 0
SUM OF ALL RESPONSES TO PHQ QUESTIONS 1-9: 0
2. FEELING DOWN, DEPRESSED OR HOPELESS: NOT AT ALL
SUM OF ALL RESPONSES TO PHQ QUESTIONS 1-9: 0

## 2024-09-19 RX ORDER — MELOXICAM 15 MG/1
15 TABLET ORAL DAILY PRN
Qty: 30 TABLET | Refills: 5 | OUTPATIENT
Start: 2024-09-19

## 2024-09-22 SDOH — ECONOMIC STABILITY: FOOD INSECURITY: WITHIN THE PAST 12 MONTHS, THE FOOD YOU BOUGHT JUST DIDN'T LAST AND YOU DIDN'T HAVE MONEY TO GET MORE.: NEVER TRUE

## 2024-09-22 SDOH — ECONOMIC STABILITY: FOOD INSECURITY: WITHIN THE PAST 12 MONTHS, YOU WORRIED THAT YOUR FOOD WOULD RUN OUT BEFORE YOU GOT MONEY TO BUY MORE.: NEVER TRUE

## 2024-09-22 SDOH — ECONOMIC STABILITY: INCOME INSECURITY: HOW HARD IS IT FOR YOU TO PAY FOR THE VERY BASICS LIKE FOOD, HOUSING, MEDICAL CARE, AND HEATING?: SOMEWHAT HARD

## 2024-09-25 ENCOUNTER — OFFICE VISIT (OUTPATIENT)
Dept: PRIMARY CARE CLINIC | Facility: CLINIC | Age: 34
End: 2024-09-25
Payer: COMMERCIAL

## 2024-09-25 VITALS
BODY MASS INDEX: 47.09 KG/M2 | HEIGHT: 66 IN | DIASTOLIC BLOOD PRESSURE: 81 MMHG | SYSTOLIC BLOOD PRESSURE: 133 MMHG | OXYGEN SATURATION: 97 % | HEART RATE: 76 BPM | TEMPERATURE: 97.7 F | WEIGHT: 293 LBS

## 2024-09-25 DIAGNOSIS — G89.29 CHRONIC PAIN OF RIGHT KNEE: ICD-10-CM

## 2024-09-25 DIAGNOSIS — R73.01 IMPAIRED FASTING GLUCOSE: ICD-10-CM

## 2024-09-25 DIAGNOSIS — E66.01 OBESITY, MORBID: ICD-10-CM

## 2024-09-25 DIAGNOSIS — Z13.6 ENCOUNTER FOR LIPID SCREENING FOR CARDIOVASCULAR DISEASE: ICD-10-CM

## 2024-09-25 DIAGNOSIS — M25.561 CHRONIC PAIN OF RIGHT KNEE: ICD-10-CM

## 2024-09-25 DIAGNOSIS — M62.830 BACK SPASM: Primary | ICD-10-CM

## 2024-09-25 DIAGNOSIS — E55.9 VITAMIN D DEFICIENCY: ICD-10-CM

## 2024-09-25 DIAGNOSIS — M62.830 BACK SPASM: ICD-10-CM

## 2024-09-25 DIAGNOSIS — Z13.220 ENCOUNTER FOR LIPID SCREENING FOR CARDIOVASCULAR DISEASE: ICD-10-CM

## 2024-09-25 LAB
ALBUMIN SERPL-MCNC: 3.7 G/DL (ref 3.5–5)
ALBUMIN/GLOB SERPL: 1.1 (ref 1–1.9)
ALP SERPL-CCNC: 77 U/L (ref 35–104)
ALT SERPL-CCNC: 17 U/L (ref 8–45)
ANION GAP SERPL CALC-SCNC: 9 MMOL/L (ref 9–18)
AST SERPL-CCNC: 16 U/L (ref 15–37)
BASOPHILS # BLD: 0.1 K/UL (ref 0–0.2)
BASOPHILS NFR BLD: 1 % (ref 0–2)
BILIRUB SERPL-MCNC: 0.3 MG/DL (ref 0–1.2)
BUN SERPL-MCNC: 11 MG/DL (ref 6–23)
CALCIUM SERPL-MCNC: 10.6 MG/DL (ref 8.8–10.2)
CHLORIDE SERPL-SCNC: 106 MMOL/L (ref 98–107)
CHOLEST SERPL-MCNC: 153 MG/DL (ref 0–200)
CO2 SERPL-SCNC: 23 MMOL/L (ref 20–28)
CREAT SERPL-MCNC: 0.73 MG/DL (ref 0.6–1.1)
DIFFERENTIAL METHOD BLD: ABNORMAL
EOSINOPHIL # BLD: 0.5 K/UL (ref 0–0.8)
EOSINOPHIL NFR BLD: 5 % (ref 0.5–7.8)
ERYTHROCYTE [DISTWIDTH] IN BLOOD BY AUTOMATED COUNT: 15.4 % (ref 11.9–14.6)
EST. AVERAGE GLUCOSE BLD GHB EST-MCNC: 110 MG/DL
GLOBULIN SER CALC-MCNC: 3.4 G/DL (ref 2.3–3.5)
GLUCOSE SERPL-MCNC: 82 MG/DL (ref 70–99)
HBA1C MFR BLD: 5.5 % (ref 0–5.6)
HCT VFR BLD AUTO: 40.2 % (ref 35.8–46.3)
HDLC SERPL-MCNC: 36 MG/DL (ref 40–60)
HDLC SERPL: 4.3 (ref 0–5)
HGB BLD-MCNC: 12.5 G/DL (ref 11.7–15.4)
IMM GRANULOCYTES # BLD AUTO: 0 K/UL (ref 0–0.5)
IMM GRANULOCYTES NFR BLD AUTO: 0 % (ref 0–5)
LDLC SERPL CALC-MCNC: 91 MG/DL (ref 0–100)
LYMPHOCYTES # BLD: 3.8 K/UL (ref 0.5–4.6)
LYMPHOCYTES NFR BLD: 42 % (ref 13–44)
MCH RBC QN AUTO: 27.4 PG (ref 26.1–32.9)
MCHC RBC AUTO-ENTMCNC: 31.1 G/DL (ref 31.4–35)
MCV RBC AUTO: 88 FL (ref 82–102)
MONOCYTES # BLD: 0.9 K/UL (ref 0.1–1.3)
MONOCYTES NFR BLD: 10 % (ref 4–12)
NEUTS SEG # BLD: 3.9 K/UL (ref 1.7–8.2)
NEUTS SEG NFR BLD: 42 % (ref 43–78)
NRBC # BLD: 0 K/UL (ref 0–0.2)
PLATELET # BLD AUTO: 353 K/UL (ref 150–450)
PMV BLD AUTO: 11.1 FL (ref 9.4–12.3)
POTASSIUM SERPL-SCNC: 4.1 MMOL/L (ref 3.5–5.1)
PROT SERPL-MCNC: 7.1 G/DL (ref 6.3–8.2)
RBC # BLD AUTO: 4.57 M/UL (ref 4.05–5.2)
SODIUM SERPL-SCNC: 139 MMOL/L (ref 136–145)
TRIGL SERPL-MCNC: 130 MG/DL (ref 0–150)
TSH, 3RD GENERATION: 0.68 UIU/ML (ref 0.27–4.2)
VLDLC SERPL CALC-MCNC: 26 MG/DL (ref 6–23)
WBC # BLD AUTO: 9.2 K/UL (ref 4.3–11.1)

## 2024-09-25 PROCEDURE — 99214 OFFICE O/P EST MOD 30 MIN: CPT | Performed by: FAMILY MEDICINE

## 2024-09-25 RX ORDER — MELOXICAM 15 MG/1
15 TABLET ORAL DAILY PRN
Qty: 30 TABLET | Refills: 2 | Status: SHIPPED | OUTPATIENT
Start: 2024-09-25

## 2024-09-25 RX ORDER — CYCLOBENZAPRINE HCL 10 MG
10 TABLET ORAL 3 TIMES DAILY PRN
Qty: 30 TABLET | Refills: 2 | Status: SHIPPED | OUTPATIENT
Start: 2024-09-25 | End: 2024-12-24

## 2024-11-07 ENCOUNTER — PATIENT MESSAGE (OUTPATIENT)
Dept: PRIMARY CARE CLINIC | Facility: CLINIC | Age: 34
End: 2024-11-07

## 2024-11-07 DIAGNOSIS — M54.9 CHRONIC BACK PAIN, UNSPECIFIED BACK LOCATION, UNSPECIFIED BACK PAIN LATERALITY: ICD-10-CM

## 2024-11-07 DIAGNOSIS — M62.830 BACK SPASM: Primary | ICD-10-CM

## 2024-11-07 DIAGNOSIS — G89.29 CHRONIC BACK PAIN, UNSPECIFIED BACK LOCATION, UNSPECIFIED BACK PAIN LATERALITY: ICD-10-CM

## 2024-11-14 ENCOUNTER — OFFICE VISIT (OUTPATIENT)
Dept: OBGYN CLINIC | Age: 34
End: 2024-11-14
Payer: COMMERCIAL

## 2024-11-14 VITALS
SYSTOLIC BLOOD PRESSURE: 116 MMHG | WEIGHT: 293 LBS | BODY MASS INDEX: 47.09 KG/M2 | HEIGHT: 66 IN | DIASTOLIC BLOOD PRESSURE: 76 MMHG

## 2024-11-14 DIAGNOSIS — Z01.419 WELL WOMAN EXAM WITH ROUTINE GYNECOLOGICAL EXAM: ICD-10-CM

## 2024-11-14 DIAGNOSIS — Z11.51 ENCOUNTER FOR SCREENING FOR HUMAN PAPILLOMAVIRUS (HPV): ICD-10-CM

## 2024-11-14 DIAGNOSIS — Z12.4 CERVICAL CANCER SCREENING: Primary | ICD-10-CM

## 2024-11-14 PROCEDURE — 99459 PELVIC EXAMINATION: CPT | Performed by: OBSTETRICS & GYNECOLOGY

## 2024-11-14 PROCEDURE — 99385 PREV VISIT NEW AGE 18-39: CPT | Performed by: OBSTETRICS & GYNECOLOGY

## 2024-11-14 ASSESSMENT — ENCOUNTER SYMPTOMS
ALLERGIC/IMMUNOLOGIC NEGATIVE: 1
RESPIRATORY NEGATIVE: 1
GASTROINTESTINAL NEGATIVE: 1
EYES NEGATIVE: 1

## 2024-11-14 ASSESSMENT — PATIENT HEALTH QUESTIONNAIRE - PHQ9
SUM OF ALL RESPONSES TO PHQ QUESTIONS 1-9: 0
SUM OF ALL RESPONSES TO PHQ9 QUESTIONS 1 & 2: 0
1. LITTLE INTEREST OR PLEASURE IN DOING THINGS: NOT AT ALL
SUM OF ALL RESPONSES TO PHQ QUESTIONS 1-9: 0
2. FEELING DOWN, DEPRESSED OR HOPELESS: NOT AT ALL
2. FEELING DOWN, DEPRESSED OR HOPELESS: NOT AT ALL
SUM OF ALL RESPONSES TO PHQ QUESTIONS 1-9: 0
SUM OF ALL RESPONSES TO PHQ QUESTIONS 1-9: 0
1. LITTLE INTEREST OR PLEASURE IN DOING THINGS: NOT AT ALL
SUM OF ALL RESPONSES TO PHQ9 QUESTIONS 1 & 2: 0

## 2024-11-14 NOTE — PROGRESS NOTES
Of Systems:  Review of Systems    PHYSICAL EXAM:  Vitals:  vitals were not taken for this visit.  There is no height or weight on file to calculate BMI.  Physical Exam   Breast exam is {sdj breast exam:89079}.   The external genitalia {vulva:05206}.  Urethral meatus is normal. Vagina is {vagina:41415}. The bladder and urethra are {bladder:86681::\"normal \"}.  The cervix is {cervix:00085}.  The uterus is {uterus:05992}. The ovaries {ovaries:36872}.     Chaperone present due to pelvic exam preformed.    Assessment: Cydney is a  34 y.o. here for {sdjvisit:95277}  {diagnosis:30553}  2.   {diagnosis:83336}      Danielle Phillips MD                       
is normal. The ovaries are not palpated due to body habitus    Chaperone present due to pelvic exam preformed.    Assessment: Cydney is a  34 y.o. here for annual exam  well woman: pap is done  2.   pregnancy prevention: reviewed options.  Will schedule nexplanon      Danielle Phillips MD

## 2024-11-20 ENCOUNTER — OFFICE VISIT (OUTPATIENT)
Dept: PRIMARY CARE CLINIC | Facility: CLINIC | Age: 34
End: 2024-11-20

## 2024-11-20 VITALS
SYSTOLIC BLOOD PRESSURE: 131 MMHG | WEIGHT: 293 LBS | HEART RATE: 90 BPM | TEMPERATURE: 97 F | OXYGEN SATURATION: 98 % | DIASTOLIC BLOOD PRESSURE: 69 MMHG | HEIGHT: 66 IN | BODY MASS INDEX: 47.09 KG/M2

## 2024-11-20 DIAGNOSIS — M62.830 BACK SPASM: ICD-10-CM

## 2024-11-20 DIAGNOSIS — Z00.00 WELL ADULT EXAM: Primary | ICD-10-CM

## 2024-11-20 DIAGNOSIS — M25.561 CHRONIC PAIN OF RIGHT KNEE: ICD-10-CM

## 2024-11-20 DIAGNOSIS — G89.29 CHRONIC PAIN OF RIGHT KNEE: ICD-10-CM

## 2024-11-20 DIAGNOSIS — Z00.00 ENCOUNTER FOR WELL ADULT EXAM WITHOUT ABNORMAL FINDINGS: ICD-10-CM

## 2024-11-20 LAB
COLLECTION METHOD: NORMAL
CYTOLOGIST CVX/VAG CYTO: NORMAL
CYTOLOGY CVX/VAG DOC THIN PREP: NORMAL
DATE OF LMP: NORMAL
HPV APTIMA: NEGATIVE
HPV GENOTYPE REFLEX: NORMAL
Lab: NORMAL
OTHER PT INFO: NORMAL
PAP SOURCE: NORMAL
PATH REPORT.FINAL DX SPEC: NORMAL
PREV CYTO INFO: NORMAL
PREV TREATMENT RESULTS: NORMAL
PREV TREATMENT: NORMAL
STAT OF ADQ CVX/VAG CYTO-IMP: NORMAL

## 2024-11-20 RX ORDER — MELOXICAM 15 MG/1
15 TABLET ORAL DAILY PRN
Qty: 30 TABLET | Refills: 2 | Status: SHIPPED | OUTPATIENT
Start: 2024-11-20

## 2024-11-20 RX ORDER — CYCLOBENZAPRINE HCL 10 MG
10 TABLET ORAL 3 TIMES DAILY PRN
Qty: 30 TABLET | Refills: 2 | Status: SHIPPED | OUTPATIENT
Start: 2024-11-20 | End: 2025-02-18
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
## 2024-11-20 NOTE — PROGRESS NOTES
Well Adult Note  Name: Cydney Chester Today’s Date: 2024   MRN: [id number] Sex: Female   Age: 34 y.o. Ethnicity: Non- / Non    : 1990 Race: Black /       Cydney Chester is here for a well adult exam.       Subjective   History:  The patient, Cydney, reports having undergone laboratory tests in September, revealing an A1c level of 5.5. Cydney has scheduled a follow-up appointment for the next month to monitor their condition. Cydney denies any symptoms of chest pain and notes that their physical activity is primarily limited to tasks performed at their workplace. Cydney has experienced a weight loss of nearly 10 pounds since September and reports an improvement in their blood pressure readings, from 159 to the 130s. Currently, Cydney is taking Flexeril and meloxicam as needed for their condition.    Recently, Cydney underwent a pap smear and pelvic examination at a women's clinic, where Cydney was seen by a gynecologist. Cydney acknowledges the recommendation to undergo a hepatitis C screening during their subsequent visit. 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 Cydney has no additional questions or concerns at this time and confirms that their prescriptions have been forwarded to [facility] for dispensing.    Cydney has been employed at [employer] for the past two months and is currently enjoying a day off. Cydney resides in [place], which is approximately 10 minutes away from their workplace. Cydney lives with their grandmother, cousin, uncle, and mother, making a total of six individuals in their household. Cydney mentions making dietary adjustments as part of their efforts to lose weight.    Review of Systems    No Known Allergies  Prior to Visit Medications    Medication Sig Taking? Authorizing Provider   cyclobenzaprine (FLEXERIL) 10 MG tablet Take 1 tablet by mouth 3 times

## 2024-11-22 DIAGNOSIS — A59.9 TRICHOMONAS INFECTION: Primary | ICD-10-CM

## 2024-11-22 RX ORDER — METRONIDAZOLE 500 MG/1
TABLET ORAL
Qty: 4 TABLET | Refills: 0 | Status: SHIPPED | OUTPATIENT
Start: 2024-11-22

## 2024-12-31 ENCOUNTER — HOSPITAL ENCOUNTER (OUTPATIENT)
Dept: GENERAL RADIOLOGY | Age: 34
Discharge: HOME | End: 2025-01-03
Payer: COMMERCIAL

## 2024-12-31 DIAGNOSIS — M54.9 CHRONIC BACK PAIN, UNSPECIFIED BACK LOCATION, UNSPECIFIED BACK PAIN LATERALITY: ICD-10-CM

## 2024-12-31 DIAGNOSIS — G89.29 CHRONIC BACK PAIN, UNSPECIFIED BACK LOCATION, UNSPECIFIED BACK PAIN LATERALITY: ICD-10-CM

## 2024-12-31 DIAGNOSIS — M62.830 BACK SPASM: ICD-10-CM

## 2024-12-31 PROCEDURE — 72100 X-RAY EXAM L-S SPINE 2/3 VWS: CPT

## 2025-01-06 ENCOUNTER — TELEPHONE (OUTPATIENT)
Dept: PRIMARY CARE CLINIC | Facility: CLINIC | Age: 35
End: 2025-01-06

## 2025-01-06 DIAGNOSIS — M54.9 CHRONIC BACK PAIN, UNSPECIFIED BACK LOCATION, UNSPECIFIED BACK PAIN LATERALITY: Primary | ICD-10-CM

## 2025-01-06 DIAGNOSIS — G89.29 CHRONIC BACK PAIN, UNSPECIFIED BACK LOCATION, UNSPECIFIED BACK PAIN LATERALITY: Primary | ICD-10-CM

## 2025-01-06 NOTE — TELEPHONE ENCOUNTER
----- Message from Dr. Lizzy Suarez MD sent at 1/6/2025  8:01 AM EST -----  X-ray unrevealing.  Some arthritis.  Can refer to physical therapy if pain persists.  May need mri if not improving.

## 2025-01-23 DIAGNOSIS — R19.00 PELVIC MASS: Primary | ICD-10-CM

## 2025-01-30 ENCOUNTER — OFFICE VISIT (OUTPATIENT)
Dept: OBGYN CLINIC | Age: 35
End: 2025-01-30
Payer: COMMERCIAL

## 2025-01-30 VITALS — SYSTOLIC BLOOD PRESSURE: 130 MMHG | BODY MASS INDEX: 49.23 KG/M2 | DIASTOLIC BLOOD PRESSURE: 80 MMHG | WEIGHT: 293 LBS

## 2025-01-30 DIAGNOSIS — A59.9 TRICHOMONIASIS: Primary | ICD-10-CM

## 2025-01-30 DIAGNOSIS — R19.00 PELVIC MASS: ICD-10-CM

## 2025-01-30 PROCEDURE — 99213 OFFICE O/P EST LOW 20 MIN: CPT | Performed by: OBSTETRICS & GYNECOLOGY

## 2025-01-30 PROCEDURE — 99459 PELVIC EXAMINATION: CPT | Performed by: OBSTETRICS & GYNECOLOGY

## 2025-01-30 ASSESSMENT — ENCOUNTER SYMPTOMS
ALLERGIC/IMMUNOLOGIC NEGATIVE: 1
RESPIRATORY NEGATIVE: 1
EYES NEGATIVE: 1
GASTROINTESTINAL NEGATIVE: 1

## 2025-01-30 NOTE — PROGRESS NOTES
Name: Cydney Chester  Date: 2025  YOB: 1990  LMP: No LMP recorded.      Cydney is a 35 y.o.   who is here for a follow up for JAKI . She relates that she had an MRI for back pain and was noted to have a greater than 5 cm cystic structure. No pain.     Cydney  reports being sexually active and has had partner(s) who are male.  Contraception: none.   Menstrual status: regular cycles  Gyn Surgery: none     Women's Health Timeline:  No specialty comments available.      Pap History:  Diagnosis:   Date Value Ref Range Status   2024 Comment   Final     Comment:     (NOTE)  NEGATIVE FOR INTRAEPITHELIAL LESION OR MALIGNANCY.  TRICHOMONAS VAGINALIS IS PRESENT.     2021 Comment  Final     Comment:     NEGATIVE FOR INTRAEPITHELIAL LESION OR MALIGNANCY.     HPV Aptima   Date Value Ref Range Status   2024 Negative Negative   Final     Comment:     (NOTE)  This nucleic acid amplification test detects fourteen high-risk  HPV types (16,18,31,33,35,39,45,51,52,56,58,59,66,68) without  differentiation.       HPV Genotype Reflex   Date Value Ref Range Status   2024 Comment   Final     Comment:     (NOTE)  Criteria not met, HPV Genotype not performed.  No. of containers..01 ThinPrep Vial  Performed At: [address] [id number]  Parker Vivas MD Ph:[phone]  Performed At: [address] [id number]  Parker Vivas MD Ph:[phone]          OB History:  OB History    No obstetric history on file.          Medical History:  Past Medical History:  No date: Acquired hallux valgus of left foot  No date: Anemia  No date: Childhood asthma  No date: Depression      Comment:  no current meds at this time.  No date: Morbid obesity      Comment:  BMI 43.1  No date: Seasonal allergic rhinitis      Comment:  managed well with meds     Surgical History:  Past Surgical History:  No date: BUNIONECTOMY;

## 2025-02-04 LAB
SPECIMEN SOURCE: NORMAL
T VAGINALIS RRNA SPEC QL NAA+PROBE: NEGATIVE

## 2025-02-12 ENCOUNTER — OFFICE VISIT (OUTPATIENT)
Dept: OBGYN CLINIC | Age: 35
End: 2025-02-12
Payer: COMMERCIAL

## 2025-02-12 ENCOUNTER — PROCEDURE VISIT (OUTPATIENT)
Dept: OBGYN CLINIC | Age: 35
End: 2025-02-12
Payer: COMMERCIAL

## 2025-02-12 VITALS
DIASTOLIC BLOOD PRESSURE: 78 MMHG | SYSTOLIC BLOOD PRESSURE: 118 MMHG | BODY MASS INDEX: 47.09 KG/M2 | WEIGHT: 293 LBS | HEIGHT: 66 IN

## 2025-02-12 DIAGNOSIS — N83.8 MASS OF LEFT OVARY: Primary | ICD-10-CM

## 2025-02-12 DIAGNOSIS — N83.8 MASS OF LEFT OVARY: ICD-10-CM

## 2025-02-12 DIAGNOSIS — R19.00 PELVIC MASS: Primary | ICD-10-CM

## 2025-02-12 PROCEDURE — 99214 OFFICE O/P EST MOD 30 MIN: CPT | Performed by: OBSTETRICS & GYNECOLOGY

## 2025-02-12 PROCEDURE — 76830 TRANSVAGINAL US NON-OB: CPT | Performed by: OBSTETRICS & GYNECOLOGY

## 2025-02-12 SDOH — ECONOMIC STABILITY: FOOD INSECURITY: WITHIN THE PAST 12 MONTHS, YOU WORRIED THAT YOUR FOOD WOULD RUN OUT BEFORE YOU GOT MONEY TO BUY MORE.: NEVER TRUE

## 2025-02-12 SDOH — ECONOMIC STABILITY: FOOD INSECURITY: WITHIN THE PAST 12 MONTHS, THE FOOD YOU BOUGHT JUST DIDN'T LAST AND YOU DIDN'T HAVE MONEY TO GET MORE.: NEVER TRUE

## 2025-02-12 ASSESSMENT — ENCOUNTER SYMPTOMS
RESPIRATORY NEGATIVE: 1
GASTROINTESTINAL NEGATIVE: 1
EYES NEGATIVE: 1
ALLERGIC/IMMUNOLOGIC NEGATIVE: 1

## 2025-02-12 NOTE — PROGRESS NOTES
Name: Cydney Chester  Date: 2025  YOB: 1990  LMP: Patient's last menstrual period was 2025.      Cydney is a 35 y.o.   who is here for a gyn us for an ovarian cyst found serendipitously on MRI for back pain.  Now having RLQ discomfort.     Cydney  reports being sexually active and has had partner(s) who are male.  Contraception: none.   Menstrual status: regular cycles  Gyn Surgery: none     Women's Health Timeline:  No specialty comments available.      Pap History:  Diagnosis:   Date Value Ref Range Status   2024 Comment   Final     Comment:     (NOTE)  NEGATIVE FOR INTRAEPITHELIAL LESION OR MALIGNANCY.  TRICHOMONAS VAGINALIS IS PRESENT.     2021 Comment  Final     Comment:     NEGATIVE FOR INTRAEPITHELIAL LESION OR MALIGNANCY.     HPV Aptima   Date Value Ref Range Status   2024 Negative Negative   Final     Comment:     (NOTE)  This nucleic acid amplification test detects fourteen high-risk  HPV types (16,18,31,33,35,39,45,51,52,56,58,59,66,68) without  differentiation.       HPV Genotype Reflex   Date Value Ref Range Status   2024 Comment   Final     Comment:     (NOTE)  Criteria not met, HPV Genotype not performed.  No. of containers..01 ThinPrep Vial  Performed At: [facility] [id number]  Parker Vivas MD Ph:[phone]  Performed At: [facility] [address] [id number]  Parker Vivas MD Ph:[phone]          OB History:  OB History          0    Para   0    Term   0       0    AB   0    Living   0         SAB   0    IAB   0    Ectopic   0    Molar   0    Multiple   0    Live Births   0                 Medical History:  Past Medical History:  No date: Acquired hallux valgus of left foot  No date: Anemia  No date: Childhood asthma  No date: Depression      Comment:  no current meds at this time.  No date: Morbid obesity      Comment:  BMI 43.1  No date: Seasonal

## 2025-02-13 LAB — CANCER AG125 SERPL-ACNC: 13 U/ML (ref 0–38)

## 2025-02-26 ENCOUNTER — OFFICE VISIT (OUTPATIENT)
Dept: OBGYN CLINIC | Age: 35
End: 2025-02-26
Payer: COMMERCIAL

## 2025-02-26 VITALS
SYSTOLIC BLOOD PRESSURE: 122 MMHG | WEIGHT: 293 LBS | BODY MASS INDEX: 47.09 KG/M2 | HEIGHT: 66 IN | DIASTOLIC BLOOD PRESSURE: 82 MMHG

## 2025-02-26 DIAGNOSIS — N83.8 MASS OF LEFT OVARY: Primary | ICD-10-CM

## 2025-02-26 DIAGNOSIS — R93.89 ENDOMETRIAL THICKENING ON ULTRASOUND: ICD-10-CM

## 2025-02-26 DIAGNOSIS — N94.6 DYSMENORRHEA: ICD-10-CM

## 2025-02-26 DIAGNOSIS — N92.0 MENORRHAGIA WITH REGULAR CYCLE: ICD-10-CM

## 2025-02-26 PROCEDURE — 99215 OFFICE O/P EST HI 40 MIN: CPT | Performed by: OBSTETRICS & GYNECOLOGY

## 2025-02-26 NOTE — PROGRESS NOTES
Cydney Chester is a 35 y.o. Black /  female,  referred by Dr. TRINIDAD for possible surgery due to bilateral ovarian cyst(s) and endometrial thickening..  This began roughly  a few  week(s) ago. Found on lumbar MRI.  But does report pelvic pain, heavy menses and dysmenorrhea  Patient's last menstrual period was 2025 (exact date). Reports regular cycles with dysmenorrhea..    Previous treatments: none    Unsure about fertility desires.    All relevant previous notes, labs and/or imaging performed by Dr. TRINIDAD were reviewed and confirmed with pt today.  I interpreted these results and D/W pt my opinion and recommendations accordingly.     US: uterus 142cm3, endo thickness 17 mm, maybe as much as 23mm with ? Polyp in cervix; right ovary with 3.5 x 2.5 x 3.0 simple cyst; left ovary with 6.8 x 4.7 x 5.5 cyst with debris.     Cydney  has a past medical history of Acquired hallux valgus of left foot, Anemia, Childhood asthma, Depression, Morbid obesity, and Seasonal allergic rhinitis. .    OB History    Para Term  AB Living   0 0 0 0 0 0   SAB IAB Ectopic Molar Multiple Live Births   0 0 0 0 0 0         Her surgeries include  has a past surgical history that includes Bunionectomy (Left); Tonsillectomy (as a child); Knee arthroscopy (Right, 2024); and Knee arthroscopy (Right, 2024)..    Her current meds are   Current Outpatient Medications on File Prior to Visit   Medication Sig Dispense Refill    meloxicam (MOBIC) 15 MG tablet Take 1 tablet by mouth daily as needed for Pain 30 tablet 2     No current facility-administered medications on file prior to visit.         No Known Allergies     Family history is significant for family history includes Asthma in her maternal grandmother; Diabetes in her maternal aunt and maternal grandmother; No Known Problems in her father and mother..    Cydney  reports that she has never smoked. She has never used smokeless tobacco. She reports

## 2025-02-27 ENCOUNTER — PREP FOR PROCEDURE (OUTPATIENT)
Dept: OBGYN CLINIC | Age: 35
End: 2025-02-27

## 2025-02-27 DIAGNOSIS — N83.202 BILATERAL OVARIAN CYSTS: ICD-10-CM

## 2025-02-27 DIAGNOSIS — N83.201 BILATERAL OVARIAN CYSTS: ICD-10-CM

## 2025-02-27 PROBLEM — N92.0 MENORRHAGIA: Status: ACTIVE | Noted: 2025-02-27

## 2025-03-10 ENCOUNTER — PATIENT MESSAGE (OUTPATIENT)
Dept: PRIMARY CARE CLINIC | Facility: CLINIC | Age: 35
End: 2025-03-10

## 2025-03-10 ENCOUNTER — OFFICE VISIT (OUTPATIENT)
Dept: OBGYN CLINIC | Age: 35
End: 2025-03-10

## 2025-03-10 VITALS
HEIGHT: 66 IN | DIASTOLIC BLOOD PRESSURE: 82 MMHG | SYSTOLIC BLOOD PRESSURE: 124 MMHG | WEIGHT: 293 LBS | BODY MASS INDEX: 47.09 KG/M2

## 2025-03-10 DIAGNOSIS — R93.89 ENDOMETRIAL THICKENING ON ULTRASOUND: ICD-10-CM

## 2025-03-10 DIAGNOSIS — N94.6 DYSMENORRHEA: ICD-10-CM

## 2025-03-10 DIAGNOSIS — N92.0 MENORRHAGIA WITH REGULAR CYCLE: ICD-10-CM

## 2025-03-10 DIAGNOSIS — N83.8 MASS OF LEFT OVARY: ICD-10-CM

## 2025-03-10 DIAGNOSIS — N83.201 BILATERAL OVARIAN CYSTS: Primary | ICD-10-CM

## 2025-03-10 DIAGNOSIS — N83.202 BILATERAL OVARIAN CYSTS: Primary | ICD-10-CM

## 2025-03-26 NOTE — PERIOP NOTE
Patient verified name and .  Order for consent  was not found in EHR; patient verifies procedure.   Type 3 surgery, PHONE assessment complete.  Orders NOT received.  Labs per surgeon: No orders received.   Labs per anesthesia protocol: cbc, bmp, T&S--all to be completed DOS. Patient unable to have lab work completed prior to sx due to schedule.     Patient answered medical/surgical history questions at their best of ability. All prior to admission medications documented in EPIC.    Patient instructed to continue taking all prescription medications up to the day of surgery but to take only the following medications the day of surgery according to anesthesia guidelines with a small sip of water: NONE. Also, patient is requested to take 2 Tylenol in the morning and then again before bed on the day before surgery. Regular or extra strength may be used.       Patient informed that all vitamins and supplements should be held 7 days prior to surgery and NSAIDS 5 days prior to surgery. Prescription meds to hold: Meloxicam 5 days prior to surgery.     Patient instructed on the following:    > Arrive at [facility] Entrance, time of arrival to be called the day before by 1700  > No food after midnight, patient may drink clear liquids up until 2 hours prior to arrival. No gum, candy, mints.   > Responsible adult must drive patient to the hospital, stay during surgery, and patient will need supervision 24 hours after anesthesia  > Use non moisturizing soap in shower the night before surgery and on the morning of surgery  > All piercings must be removed prior to arrival.    > Leave all valuables (money and jewelry) at home but bring insurance card and ID on DOS.   > You may be required to pay a deductible or co-pay on the day of your procedure. You can pre-pay by calling [phone] if your surgery is at the [facility] or [phone] if your surgery is at the [facility].  > Do not wear make-up, nail polish, lotions, cologne,  perfumes, powders, or oil on skin. Artificial nails are not permitted.

## 2025-03-31 RX ORDER — SODIUM CHLORIDE 0.9 % (FLUSH) 0.9 %
5-40 SYRINGE (ML) INJECTION PRN
Status: CANCELLED | OUTPATIENT
Start: 2025-03-31

## 2025-03-31 RX ORDER — SODIUM CHLORIDE 9 MG/ML
INJECTION, SOLUTION INTRAVENOUS PRN
Status: CANCELLED | OUTPATIENT
Start: 2025-03-31

## 2025-03-31 RX ORDER — SODIUM CHLORIDE 0.9 % (FLUSH) 0.9 %
5-40 SYRINGE (ML) INJECTION EVERY 12 HOURS SCHEDULED
Status: CANCELLED | OUTPATIENT
Start: 2025-03-31

## 2025-03-31 RX ORDER — ACETAMINOPHEN 325 MG/1
1000 TABLET ORAL ONCE
Status: CANCELLED | OUTPATIENT
Start: 2025-03-31 | End: 2025-03-31

## 2025-03-31 NOTE — H&P
Cydney Chester is a 35 y.o. Black /  female,  here for preop due to bilateral ovarian cyst(s) and endometrial thickening..  This began roughly  a few  week(s) ago. Found on lumbar MRI.  But does report pelvic pain, heavy menses and dysmenorrhea  Patient's last menstrual period was 2025 (exact date). Reports regular cycles with dysmenorrhea..    Previous treatments: none    Unsure about fertility desires.      US: uterus 142cm3, endo thickness 17 mm, maybe as much as 23mm with ? Polyp in cervix; right ovary with 3.5 x 2.5 x 3.0 simple cyst; left ovary with 6.8 x 4.7 x 5.5 cyst with debris.     Cydney  has a past medical history of Acquired hallux valgus of left foot, Anemia, Childhood asthma, Depression, History of asthma, Morbid obesity, and Seasonal allergic rhinitis. .    OB History    Para Term  AB Living   0 0 0 0 0 0   SAB IAB Ectopic Molar Multiple Live Births   0 0 0 0 0 0         Her surgeries include  has a past surgical history that includes Bunionectomy (Left); Tonsillectomy (as a child); Knee arthroscopy (Right, 2024); and Knee arthroscopy (Right, 2024)..    Her current meds are   Current Outpatient Medications on File Prior to Visit   Medication Sig Dispense Refill    meloxicam (MOBIC) 15 MG tablet Take 1 tablet by mouth daily as needed for Pain 30 tablet 2     No current facility-administered medications on file prior to visit.         No Known Allergies     Family history is significant for family history includes Asthma in her maternal grandmother; Diabetes in her maternal aunt and maternal grandmother; No Known Problems in her father and mother..    Cydney  reports that she has never smoked. She has never used smokeless tobacco. She reports that she does not currently use alcohol. She reports that she does not use drugs.     She completed her Systems Review which is documented below.  I tried to relate any problem to gyn systems and if not

## 2025-03-31 NOTE — H&P (VIEW-ONLY)
Cydney Chester is a 35 y.o. Black /  female,  here for preop due to bilateral ovarian cyst(s) and endometrial thickening..  This began roughly  a few  week(s) ago. Found on lumbar MRI.  But does report pelvic pain, heavy menses and dysmenorrhea  Patient's last menstrual period was 2025 (exact date). Reports regular cycles with dysmenorrhea..    Previous treatments: none    Unsure about fertility desires.      US: uterus 142cm3, endo thickness 17 mm, maybe as much as 23mm with ? Polyp in cervix; right ovary with 3.5 x 2.5 x 3.0 simple cyst; left ovary with 6.8 x 4.7 x 5.5 cyst with debris.     Cydney  has a past medical history of Acquired hallux valgus of left foot, Anemia, Childhood asthma, Depression, History of asthma, Morbid obesity, and Seasonal allergic rhinitis. .    OB History    Para Term  AB Living   0 0 0 0 0 0   SAB IAB Ectopic Molar Multiple Live Births   0 0 0 0 0 0         Her surgeries include  has a past surgical history that includes Bunionectomy (Left); Tonsillectomy (as a child); Knee arthroscopy (Right, 2024); and Knee arthroscopy (Right, 2024)..    Her current meds are   Current Outpatient Medications on File Prior to Visit   Medication Sig Dispense Refill    meloxicam (MOBIC) 15 MG tablet Take 1 tablet by mouth daily as needed for Pain 30 tablet 2     No current facility-administered medications on file prior to visit.         No Known Allergies     Family history is significant for family history includes Asthma in her maternal grandmother; Diabetes in her maternal aunt and maternal grandmother; No Known Problems in her father and mother..    Cydney  reports that she has never smoked. She has never used smokeless tobacco. She reports that she does not currently use alcohol. She reports that she does not use drugs.     She completed her Systems Review which is documented below.  I tried to relate any problem to gyn systems and if not  related she is referred to her PCP.     Review of Systems   All other systems reviewed and are negative.       Blood pressure 124/82, height 1.676 m (5' 6\"), weight (!) 139.7 kg (308 lb), last menstrual period 02/24/2025.  Body mass index is 49.71 kg/m².     A&Ox3.  NAD  NL thought/judgment  Psych - grossly NL, not anxious  Neuro - CN 2-12 grossly intact. NL gait and movement  HEENT - NC/AT EOMi, PERRL  CV RRR  Lungs CTAB  Pelvic: Deferred     Cydney was seen today for pre-op exam.    Diagnoses and all orders for this visit:    Bilateral ovarian cysts    Mass of left ovary    Menorrhagia with regular cycle    Endometrial thickening on ultrasound    Dysmenorrhea     Suspect endometrioma / endometriosis.    Preop Visit    Ms. Cydney Chester presents for a preop visit.  She is scheduled for a Robotic: Bilateral Ovarian Cystectomy Destruction / Resection of endometriosis Operative Hysteroscopy (D&C) .  Her history, meds, and allergies were reviewed.  The procedure was reviewed in detail as well as the risks of bleeding, infection, DVT and potential surgical complications involving the bladder, ureters, colon or intestines.  Discussed that some complications such as those involving the ureters, bowel or colon could be undetected and noticed in the days following surgery. Also the alternatives,  benefits, recovery and follow-up. Prevention of DVT & SSI discussed as indicated.  All of her questions were answered.    Don Paul MD  March 31, 2025     No follow-ups on file.    Don Paul MD

## 2025-04-01 ENCOUNTER — ANESTHESIA EVENT (OUTPATIENT)
Dept: SURGERY | Age: 35
End: 2025-04-01
Payer: COMMERCIAL

## 2025-04-02 ENCOUNTER — ANESTHESIA (OUTPATIENT)
Dept: SURGERY | Age: 35
End: 2025-04-02
Payer: COMMERCIAL

## 2025-04-02 ENCOUNTER — HOSPITAL ENCOUNTER (OUTPATIENT)
Age: 35
Discharge: HOME | End: 2025-04-02
Attending: OBSTETRICS & GYNECOLOGY | Admitting: OBSTETRICS & GYNECOLOGY
Payer: COMMERCIAL

## 2025-04-02 VITALS
TEMPERATURE: 97.5 F | HEIGHT: 66 IN | BODY MASS INDEX: 47.09 KG/M2 | SYSTOLIC BLOOD PRESSURE: 110 MMHG | HEART RATE: 62 BPM | OXYGEN SATURATION: 98 % | DIASTOLIC BLOOD PRESSURE: 74 MMHG | WEIGHT: 293 LBS | RESPIRATION RATE: 18 BRPM

## 2025-04-02 DIAGNOSIS — G89.18 POST-OP PAIN: Primary | ICD-10-CM

## 2025-04-02 PROBLEM — N80.9 ENDOMETRIOSIS: Status: ACTIVE | Noted: 2025-04-02

## 2025-04-02 PROBLEM — N83.8 MASS OF LEFT OVARY: Status: ACTIVE | Noted: 2025-04-02

## 2025-04-02 LAB
ABO + RH BLD: NORMAL
ANION GAP SERPL CALC-SCNC: 9 MMOL/L (ref 7–16)
BASOPHILS # BLD: 0.08 K/UL (ref 0–0.2)
BASOPHILS NFR BLD: 0.9 % (ref 0–2)
BLOOD GROUP ANTIBODIES SERPL: NORMAL
BUN SERPL-MCNC: 11 MG/DL (ref 6–23)
CALCIUM SERPL-MCNC: 11.1 MG/DL (ref 8.8–10.2)
CHLORIDE SERPL-SCNC: 104 MMOL/L (ref 98–107)
CO2 SERPL-SCNC: 26 MMOL/L (ref 20–29)
CREAT SERPL-MCNC: 0.72 MG/DL (ref 0.6–1.1)
DIFFERENTIAL METHOD BLD: ABNORMAL
EOSINOPHIL # BLD: 0.37 K/UL (ref 0–0.8)
EOSINOPHIL NFR BLD: 4 % (ref 0.5–7.8)
ERYTHROCYTE [DISTWIDTH] IN BLOOD BY AUTOMATED COUNT: 15 % (ref 11.9–14.6)
GLUCOSE SERPL-MCNC: 83 MG/DL (ref 70–99)
HCG UR QL: NEGATIVE
HCT VFR BLD AUTO: 40 % (ref 35.8–46.3)
HGB BLD-MCNC: 12.4 G/DL (ref 11.7–15.4)
IMM GRANULOCYTES # BLD AUTO: 0.05 K/UL (ref 0–0.5)
IMM GRANULOCYTES NFR BLD AUTO: 0.5 % (ref 0–5)
LYMPHOCYTES # BLD: 3.86 K/UL (ref 0.5–4.6)
LYMPHOCYTES NFR BLD: 41.6 % (ref 13–44)
MCH RBC QN AUTO: 26.8 PG (ref 26.1–32.9)
MCHC RBC AUTO-ENTMCNC: 31 G/DL (ref 31.4–35)
MCV RBC AUTO: 86.6 FL (ref 82–102)
MONOCYTES # BLD: 0.74 K/UL (ref 0.1–1.3)
MONOCYTES NFR BLD: 8 % (ref 4–12)
NEUTS SEG # BLD: 4.17 K/UL (ref 1.7–8.2)
NEUTS SEG NFR BLD: 45 % (ref 43–78)
NRBC # BLD: 0 K/UL (ref 0–0.2)
PLATELET # BLD AUTO: 353 K/UL (ref 150–450)
PMV BLD AUTO: 10.4 FL (ref 9.4–12.3)
POTASSIUM SERPL-SCNC: 4.4 MMOL/L (ref 3.5–5.1)
RBC # BLD AUTO: 4.62 M/UL (ref 4.05–5.2)
SODIUM SERPL-SCNC: 139 MMOL/L (ref 136–145)
SPECIMEN EXP DATE BLD: NORMAL
WBC # BLD AUTO: 9.3 K/UL (ref 4.3–11.1)

## 2025-04-02 PROCEDURE — 86850 RBC ANTIBODY SCREEN: CPT

## 2025-04-02 PROCEDURE — 58662 LAPAROSCOPY EXCISE LESIONS: CPT | Performed by: OBSTETRICS & GYNECOLOGY

## 2025-04-02 PROCEDURE — 2580000003 HC RX 258: Performed by: ANESTHESIOLOGY

## 2025-04-02 PROCEDURE — 2500000003 HC RX 250 WO HCPCS: Performed by: NURSE ANESTHETIST, CERTIFIED REGISTERED

## 2025-04-02 PROCEDURE — 6360000002 HC RX W HCPCS

## 2025-04-02 PROCEDURE — C1765 ADHESION BARRIER: HCPCS | Performed by: OBSTETRICS & GYNECOLOGY

## 2025-04-02 PROCEDURE — 6360000002 HC RX W HCPCS: Performed by: REGISTERED NURSE

## 2025-04-02 PROCEDURE — 6360000002 HC RX W HCPCS: Performed by: NURSE ANESTHETIST, CERTIFIED REGISTERED

## 2025-04-02 PROCEDURE — 58558 HYSTEROSCOPY BIOPSY: CPT | Performed by: OBSTETRICS & GYNECOLOGY

## 2025-04-02 PROCEDURE — S2900 ROBOTIC SURGICAL SYSTEM: HCPCS | Performed by: OBSTETRICS & GYNECOLOGY

## 2025-04-02 PROCEDURE — 2709999900 HC NON-CHARGEABLE SUPPLY: Performed by: OBSTETRICS & GYNECOLOGY

## 2025-04-02 PROCEDURE — 86900 BLOOD TYPING SEROLOGIC ABO: CPT

## 2025-04-02 PROCEDURE — 3700000000 HC ANESTHESIA ATTENDED CARE: Performed by: OBSTETRICS & GYNECOLOGY

## 2025-04-02 PROCEDURE — 88305 TISSUE EXAM BY PATHOLOGIST: CPT

## 2025-04-02 PROCEDURE — 3600000009 HC SURGERY ROBOT BASE: Performed by: OBSTETRICS & GYNECOLOGY

## 2025-04-02 PROCEDURE — 7100000011 HC PHASE II RECOVERY - ADDTL 15 MIN: Performed by: OBSTETRICS & GYNECOLOGY

## 2025-04-02 PROCEDURE — 6360000002 HC RX W HCPCS: Performed by: OBSTETRICS & GYNECOLOGY

## 2025-04-02 PROCEDURE — 6360000002 HC RX W HCPCS: Performed by: ANESTHESIOLOGY

## 2025-04-02 PROCEDURE — 7100000000 HC PACU RECOVERY - FIRST 15 MIN: Performed by: OBSTETRICS & GYNECOLOGY

## 2025-04-02 PROCEDURE — 86901 BLOOD TYPING SEROLOGIC RH(D): CPT

## 2025-04-02 PROCEDURE — 80048 BASIC METABOLIC PNL TOTAL CA: CPT

## 2025-04-02 PROCEDURE — 6370000000 HC RX 637 (ALT 250 FOR IP): Performed by: ANESTHESIOLOGY

## 2025-04-02 PROCEDURE — 3600000019 HC SURGERY ROBOT ADDTL 15MIN: Performed by: OBSTETRICS & GYNECOLOGY

## 2025-04-02 PROCEDURE — 2500000003 HC RX 250 WO HCPCS

## 2025-04-02 PROCEDURE — 7100000010 HC PHASE II RECOVERY - FIRST 15 MIN: Performed by: OBSTETRICS & GYNECOLOGY

## 2025-04-02 PROCEDURE — 7100000001 HC PACU RECOVERY - ADDTL 15 MIN: Performed by: OBSTETRICS & GYNECOLOGY

## 2025-04-02 PROCEDURE — 85025 COMPLETE CBC W/AUTO DIFF WBC: CPT

## 2025-04-02 PROCEDURE — 3700000001 HC ADD 15 MINUTES (ANESTHESIA): Performed by: OBSTETRICS & GYNECOLOGY

## 2025-04-02 PROCEDURE — 81025 URINE PREGNANCY TEST: CPT

## 2025-04-02 RX ORDER — SODIUM CHLORIDE 9 MG/ML
INJECTION, SOLUTION INTRAVENOUS PRN
Status: DISCONTINUED | OUTPATIENT
Start: 2025-04-02 | End: 2025-04-02 | Stop reason: SDUPTHER

## 2025-04-02 RX ORDER — KETOROLAC TROMETHAMINE 30 MG/ML
INJECTION, SOLUTION INTRAMUSCULAR; INTRAVENOUS
Status: DISCONTINUED | OUTPATIENT
Start: 2025-04-02 | End: 2025-04-02 | Stop reason: SDUPTHER

## 2025-04-02 RX ORDER — SODIUM CHLORIDE 9 MG/ML
INJECTION, SOLUTION INTRAVENOUS PRN
Status: DISCONTINUED | OUTPATIENT
Start: 2025-04-02 | End: 2025-04-02 | Stop reason: HOSPADM

## 2025-04-02 RX ORDER — SODIUM CHLORIDE, SODIUM LACTATE, POTASSIUM CHLORIDE, CALCIUM CHLORIDE 600; 310; 30; 20 MG/100ML; MG/100ML; MG/100ML; MG/100ML
INJECTION, SOLUTION INTRAVENOUS CONTINUOUS
Status: DISCONTINUED | OUTPATIENT
Start: 2025-04-02 | End: 2025-04-02 | Stop reason: HOSPADM

## 2025-04-02 RX ORDER — OXYCODONE HYDROCHLORIDE 5 MG/1
5 TABLET ORAL
Status: COMPLETED | OUTPATIENT
Start: 2025-04-02 | End: 2025-04-02

## 2025-04-02 RX ORDER — LABETALOL HYDROCHLORIDE 5 MG/ML
10 INJECTION, SOLUTION INTRAVENOUS
Status: DISCONTINUED | OUTPATIENT
Start: 2025-04-02 | End: 2025-04-02 | Stop reason: HOSPADM

## 2025-04-02 RX ORDER — BUPIVACAINE HYDROCHLORIDE 5 MG/ML
INJECTION, SOLUTION EPIDURAL; INTRACAUDAL; PERINEURAL PRN
Status: DISCONTINUED | OUTPATIENT
Start: 2025-04-02 | End: 2025-04-02 | Stop reason: ALTCHOICE

## 2025-04-02 RX ORDER — IBUPROFEN 800 MG/1
800 TABLET, FILM COATED ORAL EVERY 8 HOURS PRN
Qty: 60 TABLET | Refills: 0 | Status: SHIPPED | OUTPATIENT
Start: 2025-04-02

## 2025-04-02 RX ORDER — SODIUM CHLORIDE 0.9 % (FLUSH) 0.9 %
5-40 SYRINGE (ML) INJECTION PRN
Status: DISCONTINUED | OUTPATIENT
Start: 2025-04-02 | End: 2025-04-02 | Stop reason: SDUPTHER

## 2025-04-02 RX ORDER — LIDOCAINE HYDROCHLORIDE 20 MG/ML
INJECTION, SOLUTION EPIDURAL; INFILTRATION; INTRACAUDAL; PERINEURAL
Status: DISCONTINUED | OUTPATIENT
Start: 2025-04-02 | End: 2025-04-02 | Stop reason: SDUPTHER

## 2025-04-02 RX ORDER — GLYCOPYRROLATE 0.2 MG/ML
INJECTION INTRAMUSCULAR; INTRAVENOUS
Status: DISCONTINUED | OUTPATIENT
Start: 2025-04-02 | End: 2025-04-02 | Stop reason: SDUPTHER

## 2025-04-02 RX ORDER — OXYCODONE HYDROCHLORIDE 5 MG/1
5 TABLET ORAL EVERY 6 HOURS PRN
Qty: 15 TABLET | Refills: 0 | Status: SHIPPED | OUTPATIENT
Start: 2025-04-02 | End: 2025-04-07

## 2025-04-02 RX ORDER — ONDANSETRON 2 MG/ML
INJECTION INTRAMUSCULAR; INTRAVENOUS
Status: DISCONTINUED | OUTPATIENT
Start: 2025-04-02 | End: 2025-04-02 | Stop reason: SDUPTHER

## 2025-04-02 RX ORDER — NALOXONE HYDROCHLORIDE 0.4 MG/ML
INJECTION, SOLUTION INTRAMUSCULAR; INTRAVENOUS; SUBCUTANEOUS PRN
Status: DISCONTINUED | OUTPATIENT
Start: 2025-04-02 | End: 2025-04-02 | Stop reason: HOSPADM

## 2025-04-02 RX ORDER — SODIUM CHLORIDE 0.9 % (FLUSH) 0.9 %
5-40 SYRINGE (ML) INJECTION EVERY 12 HOURS SCHEDULED
Status: DISCONTINUED | OUTPATIENT
Start: 2025-04-02 | End: 2025-04-02 | Stop reason: SDUPTHER

## 2025-04-02 RX ORDER — EPHEDRINE SULFATE 5 MG/ML
INJECTION INTRAVENOUS
Status: DISCONTINUED | OUTPATIENT
Start: 2025-04-02 | End: 2025-04-02 | Stop reason: SDUPTHER

## 2025-04-02 RX ORDER — ONDANSETRON 2 MG/ML
4 INJECTION INTRAMUSCULAR; INTRAVENOUS
Status: COMPLETED | OUTPATIENT
Start: 2025-04-02 | End: 2025-04-02

## 2025-04-02 RX ORDER — ONDANSETRON 8 MG/1
8 TABLET, ORALLY DISINTEGRATING ORAL EVERY 8 HOURS PRN
Qty: 12 TABLET | Refills: 0 | Status: SHIPPED | OUTPATIENT
Start: 2025-04-02

## 2025-04-02 RX ORDER — ACETAMINOPHEN 500 MG
1000 TABLET ORAL ONCE
Status: DISCONTINUED | OUTPATIENT
Start: 2025-04-02 | End: 2025-04-02 | Stop reason: SDUPTHER

## 2025-04-02 RX ORDER — DEXAMETHASONE SODIUM PHOSPHATE 10 MG/ML
INJECTION, SOLUTION INTRA-ARTICULAR; INTRALESIONAL; INTRAMUSCULAR; INTRAVENOUS; SOFT TISSUE
Status: DISCONTINUED | OUTPATIENT
Start: 2025-04-02 | End: 2025-04-02 | Stop reason: SDUPTHER

## 2025-04-02 RX ORDER — MIDAZOLAM HYDROCHLORIDE 1 MG/ML
INJECTION, SOLUTION INTRAMUSCULAR; INTRAVENOUS
Status: DISCONTINUED | OUTPATIENT
Start: 2025-04-02 | End: 2025-04-02 | Stop reason: SDUPTHER

## 2025-04-02 RX ORDER — LIDOCAINE HYDROCHLORIDE 10 MG/ML
1 INJECTION, SOLUTION INFILTRATION; PERINEURAL
Status: DISCONTINUED | OUTPATIENT
Start: 2025-04-02 | End: 2025-04-02 | Stop reason: HOSPADM

## 2025-04-02 RX ORDER — MIDAZOLAM HYDROCHLORIDE 2 MG/2ML
2 INJECTION, SOLUTION INTRAMUSCULAR; INTRAVENOUS
Status: DISCONTINUED | OUTPATIENT
Start: 2025-04-02 | End: 2025-04-02 | Stop reason: HOSPADM

## 2025-04-02 RX ORDER — ACETAMINOPHEN 500 MG
1000 TABLET ORAL ONCE
Status: COMPLETED | OUTPATIENT
Start: 2025-04-02 | End: 2025-04-02

## 2025-04-02 RX ORDER — FENTANYL CITRATE 50 UG/ML
INJECTION, SOLUTION INTRAMUSCULAR; INTRAVENOUS
Status: DISCONTINUED | OUTPATIENT
Start: 2025-04-02 | End: 2025-04-02 | Stop reason: SDUPTHER

## 2025-04-02 RX ORDER — FENTANYL CITRATE 50 UG/ML
100 INJECTION, SOLUTION INTRAMUSCULAR; INTRAVENOUS
Status: DISCONTINUED | OUTPATIENT
Start: 2025-04-02 | End: 2025-04-02 | Stop reason: HOSPADM

## 2025-04-02 RX ORDER — ROCURONIUM BROMIDE 10 MG/ML
INJECTION, SOLUTION INTRAVENOUS
Status: DISCONTINUED | OUTPATIENT
Start: 2025-04-02 | End: 2025-04-02 | Stop reason: SDUPTHER

## 2025-04-02 RX ORDER — PROPOFOL 10 MG/ML
INJECTION, EMULSION INTRAVENOUS
Status: DISCONTINUED | OUTPATIENT
Start: 2025-04-02 | End: 2025-04-02 | Stop reason: SDUPTHER

## 2025-04-02 RX ORDER — PROCHLORPERAZINE EDISYLATE 5 MG/ML
5 INJECTION INTRAMUSCULAR; INTRAVENOUS
Status: DISCONTINUED | OUTPATIENT
Start: 2025-04-02 | End: 2025-04-02 | Stop reason: HOSPADM

## 2025-04-02 RX ORDER — NEOSTIGMINE METHYLSULFATE 1 MG/ML
INJECTION, SOLUTION INTRAVENOUS
Status: DISCONTINUED | OUTPATIENT
Start: 2025-04-02 | End: 2025-04-02 | Stop reason: SDUPTHER

## 2025-04-02 RX ORDER — KETAMINE HCL IN NACL, ISO-OSM 20 MG/2 ML
SYRINGE (ML) INJECTION
Status: DISCONTINUED | OUTPATIENT
Start: 2025-04-02 | End: 2025-04-02 | Stop reason: SDUPTHER

## 2025-04-02 RX ORDER — SODIUM CHLORIDE 0.9 % (FLUSH) 0.9 %
5-40 SYRINGE (ML) INJECTION PRN
Status: DISCONTINUED | OUTPATIENT
Start: 2025-04-02 | End: 2025-04-02 | Stop reason: HOSPADM

## 2025-04-02 RX ORDER — HYDRALAZINE HYDROCHLORIDE 20 MG/ML
10 INJECTION INTRAMUSCULAR; INTRAVENOUS
Status: DISCONTINUED | OUTPATIENT
Start: 2025-04-02 | End: 2025-04-02 | Stop reason: HOSPADM

## 2025-04-02 RX ORDER — SODIUM CHLORIDE 0.9 % (FLUSH) 0.9 %
5-40 SYRINGE (ML) INJECTION EVERY 12 HOURS SCHEDULED
Status: DISCONTINUED | OUTPATIENT
Start: 2025-04-02 | End: 2025-04-02 | Stop reason: HOSPADM

## 2025-04-02 RX ADMIN — SODIUM CHLORIDE, SODIUM LACTATE, POTASSIUM CHLORIDE, AND CALCIUM CHLORIDE: 600; 310; 30; 20 INJECTION, SOLUTION INTRAVENOUS at 14:06

## 2025-04-02 RX ADMIN — PROPOFOL 200 MG: 10 INJECTION, EMULSION INTRAVENOUS at 13:29

## 2025-04-02 RX ADMIN — ONDANSETRON 4 MG: 2 INJECTION, SOLUTION INTRAMUSCULAR; INTRAVENOUS at 15:59

## 2025-04-02 RX ADMIN — Medication 5 MG: at 15:04

## 2025-04-02 RX ADMIN — Medication 3000 MG: at 13:37

## 2025-04-02 RX ADMIN — MIDAZOLAM 2 MG: 1 INJECTION INTRAMUSCULAR; INTRAVENOUS at 13:21

## 2025-04-02 RX ADMIN — DEXAMETHASONE SODIUM PHOSPHATE 10 MG: 10 INJECTION INTRAMUSCULAR; INTRAVENOUS at 13:37

## 2025-04-02 RX ADMIN — GLYCOPYRROLATE 0.5 MG: 0.2 INJECTION INTRAMUSCULAR; INTRAVENOUS at 15:04

## 2025-04-02 RX ADMIN — ONDANSETRON 4 MG: 2 INJECTION INTRAMUSCULAR; INTRAVENOUS at 14:04

## 2025-04-02 RX ADMIN — ROCURONIUM BROMIDE 10 MG: 10 INJECTION, SOLUTION INTRAVENOUS at 14:05

## 2025-04-02 RX ADMIN — FENTANYL CITRATE 100 MCG: 50 INJECTION, SOLUTION INTRAMUSCULAR; INTRAVENOUS at 13:26

## 2025-04-02 RX ADMIN — HYDROMORPHONE HYDROCHLORIDE 0.5 MG: 1 INJECTION, SOLUTION INTRAMUSCULAR; INTRAVENOUS; SUBCUTANEOUS at 15:47

## 2025-04-02 RX ADMIN — HYDROMORPHONE HYDROCHLORIDE 0.5 MG: 1 INJECTION, SOLUTION INTRAMUSCULAR; INTRAVENOUS; SUBCUTANEOUS at 15:52

## 2025-04-02 RX ADMIN — LIDOCAINE HYDROCHLORIDE 60 MG: 20 INJECTION, SOLUTION EPIDURAL; INFILTRATION; INTRACAUDAL; PERINEURAL at 13:29

## 2025-04-02 RX ADMIN — KETOROLAC TROMETHAMINE 30 MG: 30 INJECTION, SOLUTION INTRAMUSCULAR; INTRAVENOUS at 15:04

## 2025-04-02 RX ADMIN — SODIUM CHLORIDE, SODIUM LACTATE, POTASSIUM CHLORIDE, AND CALCIUM CHLORIDE: 600; 310; 30; 20 INJECTION, SOLUTION INTRAVENOUS at 12:52

## 2025-04-02 RX ADMIN — EPHEDRINE SULFATE 10 MG: 5 INJECTION INTRAVENOUS at 14:21

## 2025-04-02 RX ADMIN — ROCURONIUM BROMIDE 50 MG: 10 INJECTION, SOLUTION INTRAVENOUS at 13:29

## 2025-04-02 RX ADMIN — OXYCODONE HYDROCHLORIDE 5 MG: 5 TABLET ORAL at 15:59

## 2025-04-02 RX ADMIN — ACETAMINOPHEN 1000 MG: 500 TABLET, FILM COATED ORAL at 12:34

## 2025-04-02 RX ADMIN — PHENYLEPHRINE HYDROCHLORIDE 100 MCG: 0.1 INJECTION, SOLUTION INTRAVENOUS at 14:23

## 2025-04-02 RX ADMIN — Medication 20 MG: at 13:29

## 2025-04-02 ASSESSMENT — PAIN DESCRIPTION - DESCRIPTORS
DESCRIPTORS: CRAMPING

## 2025-04-02 ASSESSMENT — PAIN DESCRIPTION - LOCATION
LOCATION: PELVIS

## 2025-04-02 ASSESSMENT — PAIN SCALES - GENERAL
PAINLEVEL_OUTOF10: 2
PAINLEVEL_OUTOF10: 2
PAINLEVEL_OUTOF10: 7
PAINLEVEL_OUTOF10: 9
PAINLEVEL_OUTOF10: 3

## 2025-04-02 ASSESSMENT — PAIN - FUNCTIONAL ASSESSMENT: PAIN_FUNCTIONAL_ASSESSMENT: 0-10

## 2025-04-02 NOTE — ANESTHESIA POSTPROCEDURE EVALUATION
Department of Anesthesiology  Postprocedure Note    Patient: Cydney Chester  MRN: [id number]  YOB: 1990  Date of evaluation: 4/2/2025    Procedure Summary       Date: 04/02/25 Room / Location: [facility] [place] [address] [place] [address] MAIN OR    Anesthesia Start: 1314 Anesthesia Stop: 1525    Procedures:       ROBOTIC ASSISTED LEFT OVARIAN CYSTECTOMY; LYSIS OF ADHESIONS, EXCISION OF ENDOMETRIOSIS (Left: Abdomen)      HYSTEROSCOPY DILATATION CURETTAGE (Vagina ) Diagnosis:       Bilateral ovarian cysts      Menorrhagia      (Bilateral ovarian cysts [N83.201, N83.202])      (Menorrhagia [N92.0])    Surgeons: Dno Paul MD Responsible Provider: Mary Lala MD    Anesthesia Type: general ASA Status: 3            Anesthesia Type: No value filed.    Naila Phase I: Naila Score: 8    Naila Phase II:      Anesthesia Post Evaluation    Patient location during evaluation: PACU  Patient participation: complete - patient participated  Level of consciousness: awake and alert  Airway patency: patent  Nausea & Vomiting: no nausea and no vomiting  Cardiovascular status: hemodynamically stable  Respiratory status: acceptable, nonlabored ventilation and spontaneous ventilation  Hydration status: euvolemic  Comments: /78   Pulse 55   Temp 97.5 °F (36.4 °C) (Temporal)   Resp 16   Ht 1.676 m (5' 6\")   Wt (!) 137 kg (302 lb)   SpO2 98%   BMI 48.74 kg/m²     Multimodal analgesia pain management approach  Pain management: adequate and satisfactory to patient    No notable events documented.

## 2025-04-02 NOTE — INTERVAL H&P NOTE
Update History & Physical    The patient's History and Physical  was reviewed with the patient and I examined the patient. There was no change. The surgical site was confirmed by the patient and me.     Plan: The risks, benefits, expected outcome, and alternative to the recommended procedure have been discussed with the patient. Patient understands and wants to proceed with the procedure.     Electronically signed by Don Paul MD on 4/2/2025 at 1:10 PM

## 2025-04-02 NOTE — BRIEF OP NOTE
Brief Postoperative Note      Patient: Cydney Chester  YOB: 1990  MRN: [id number]    Date of Procedure: 4/2/2025    Pre-Op Diagnosis Codes:      * Bilateral ovarian cysts [N83.201, N83.202]     * Menorrhagia [N92.0]    Post-Op Diagnosis: Same       Procedure(s):  ROBOTIC ASSISTED LEFT OVARIAN CYSTECTOMY; LYSIS OF ADHESIONS, EXCISION OF ENDOMETRIOSIS  HYSTEROSCOPY DILATATION CURETTAGE    Surgeon(s):  Don Paul MD    Assistant:  * No surgical staff found *    Anesthesia: General    Estimated Blood Loss (mL): less than 50     Complications: None    Specimens:   ID Type Source Tests Collected by Time Destination   A : Endocervical curettings Tissue Cervix SURGICAL PATHOLOGY Don Paul MD 4/2/2025 1408    B : Endometrial curettings Tissue Endometrium SURGICAL PATHOLOGY Don Paul MD 4/2/2025 1408    C : Peritoneal biopsy Tissue PERITONEAL SURGICAL PATHOLOGY Don Paul MD 4/2/2025 1442    D : Left ovarian cyst Tissue Ovary SURGICAL PATHOLOGY Don Paul MD 4/2/2025 1458        Implants:  * No implants in log *      Drains:   Urinary Catheter 04/02/25 2 Way;[place] (Active)       Findings: Large left ovarian cyst c/w endometrioma v hemorrhagic cyst.  Deep right peritoneal pocket of endometriosis.  Normal appearing uterus and right adnexa.  Thickened endometrium    Electronically signed by Don Paul MD on 4/2/2025 at 3:11 PM

## 2025-04-02 NOTE — ANESTHESIA PRE PROCEDURE
Department of Anesthesiology  Preprocedure Note       Name:  Cydney Chester   Age:  35 y.o.  :  1990                                          MRN:  [id number]         Date:  2025      Surgeon: Surgeon(s):  Don Paul MD    Procedure: Procedure(s):  ROBOTIC ASSISTED BILATERAL CYSTECTOMY, POSSIBLE LEFT SALPINGOOPHERECTOMY  HYSTEROSCOPY DILATATION CURETTAGE WITH MYOSURE    Medications prior to admission:   Prior to Admission medications    Medication Sig Start Date End Date Taking? Authorizing Provider   meloxicam (MOBIC) 15 MG tablet Take 1 tablet by mouth daily as needed for Pain 24  Yes Lizzy Suarez MD       Current medications:    Current Facility-Administered Medications   Medication Dose Route Frequency Provider Last Rate Last Admin   • lidocaine 1 % injection 1 mL  1 mL IntraDERmal Once PRN John Briscoe DO       • fentaNYL (SUBLIMAZE) injection 100 mcg  100 mcg IntraVENous Once PRN John Briscoe DO       • lactated ringers infusion   IntraVENous Continuous John Briscoe  mL/hr at 25 1252 New Bag at 25 1252   • sodium chloride flush 0.9 % injection 5-40 mL  5-40 mL IntraVENous 2 times per day John Briscoe DO       • sodium chloride flush 0.9 % injection 5-40 mL  5-40 mL IntraVENous PRN John Briscoe DO       • 0.9 % sodium chloride infusion   IntraVENous PRN John Briscoe DO       • midazolam PF (VERSED) injection 2 mg  2 mg IntraVENous Once PRN John Briscoe DO       • ceFAZolin (ANCEF) 3000 mg in sterile water 30 mL IV syringe  3,000 mg IntraVENous On Call to OR Don Paul MD           Allergies:  No Known Allergies    Problem List:    Patient Active Problem List   Diagnosis Code   • Vitamin D deficiency E55.9   • Chronic midline low back pain with sciatica M54.40, G89.29   • Obesity, morbid E66.01   • Chronic pain of both knees M25.561, M25.562, G89.29   • Patellofemoral

## 2025-04-03 NOTE — PROGRESS NOTES
Called pt today just to follow up about small tooth fragment that came loose during surgery yesterday.  I explained to her what happened and gave her the opportunity to ask questions.  She voiced understanding.

## 2025-04-07 NOTE — OP NOTE
Operative Report     Patient: Cydney Chester MRN: [id number]  SSN: [id number]    YOB: 1990  Age: 35 y.o.  Sex: female       Date of Surgery: 4/2/2025       Preoperative Diagnosis: Bilateral ovarian cysts [N83.201, N83.202]  Menorrhagia [N92.0]     Postoperative Diagnosis: Left Ovarian Cyst.  Endometriosis.  Menorrhagia.    Surgeons and Role:     * Don Paul MD - Primary    Procedure: Procedure(s):  ROBOTIC ASSISTED LEFT OVARIAN CYSTECTOMY; LYSIS OF ADHESIONS, EXCISION OF ENDOMETRIOSIS  HYSTEROSCOPY DILATATION CURETTAGE     Anesthesia: General Anesthesiologist: Khari Talley MD; Mary Lala MD  CRNA: Pio Lovell APRN - CRNA; Nevin Mejía APRN - CRNA; Peter Valdez APRN - CRNA     Estimated Blood Loss:  Less than 25mL    Implants: * No implants in log *            Specimens:   ID Type Source Tests Collected by Time Destination   A : Endocervical curettings Tissue Cervix SURGICAL PATHOLOGY Don Paul MD 4/2/2025 1408    B : Endometrial curettings Tissue Endometrium SURGICAL PATHOLOGY Don Paul MD 4/2/2025 1408    C : Peritoneal biopsy Tissue PERITONEAL SURGICAL PATHOLOGY Don Paul MD 4/2/2025 1442    D : Left ovarian cyst Tissue Ovary SURGICAL PATHOLOGY Don Paul MD 4/2/2025 1458                        Findings: Large left ovarian cyst c/w endometrioma v hemorrhagic cyst. Deep right peritoneal pocket of endometriosis. Normal appearing uterus and right adnexa. Thickened endometrium     Complications: None    Procedure in Detail:   The patient was taken to the operating room where she was placed in the supine position. After undergoing adequate general endotracheal anesthesia, she was placed up in the Juan laparoscopy stirrups in the dorsal lithotomy position. Both arms were tucked to the side. Time out was performed confirming the patient and procedure. The patient was then prepped and draped in the usual

## 2025-04-10 ENCOUNTER — OFFICE VISIT (OUTPATIENT)
Dept: OBGYN CLINIC | Age: 35
End: 2025-04-10

## 2025-04-10 VITALS — BODY MASS INDEX: 48.74 KG/M2 | WEIGHT: 293 LBS

## 2025-04-10 DIAGNOSIS — Z30.017 NEXPLANON INSERTION: Primary | ICD-10-CM

## 2025-04-10 RX ORDER — OXYCODONE HYDROCHLORIDE 5 MG/1
5 CAPSULE ORAL EVERY 4 HOURS PRN
COMMUNITY

## 2025-04-10 ASSESSMENT — ENCOUNTER SYMPTOMS
EYES NEGATIVE: 1
RESPIRATORY NEGATIVE: 1
GASTROINTESTINAL NEGATIVE: 1
ALLERGIC/IMMUNOLOGIC NEGATIVE: 1

## 2025-04-10 NOTE — PROGRESS NOTES
for self-injury and suicidal ideas.        PHYSICAL EXAM:  Vitals:  weight is 137 kg (302 lb) (abnormal).  Body mass index is 48.74 kg/m².  Physical Exam  Constitutional:       General: She is awake. She is not in acute distress.     Appearance: Normal appearance. She is not ill-appearing.   HENT:      Head: Normocephalic and atraumatic.   Eyes:      Conjunctiva/sclera: Conjunctivae normal.   Cardiovascular:      Rate and Rhythm: Normal rate.   Pulmonary:      Effort: Pulmonary effort is normal.   Musculoskeletal:         General: Normal range of motion.      Cervical back: Normal range of motion.   Skin:     General: Skin is warm and dry.   Neurological:      General: No focal deficit present.      Mental Status: She is alert and oriented to person, place, and time.      Motor: Motor function is intact.      Coordination: Coordination is intact.   Psychiatric:         Behavior: Behavior normal.         Cognition and Memory: Cognition normal.         Judgment: Judgment normal.        ..PROCEDURE: nexplanon insertion    Area cleansed with betadine  Nexplanon inserted without difficulty  Area cleansed and dressed       Assessment: Cydney is a  35 y.o. here for problem visit for nexplanon insertion        Danielle Phillips MD

## 2025-04-16 ENCOUNTER — OFFICE VISIT (OUTPATIENT)
Dept: OBGYN CLINIC | Age: 35
End: 2025-04-16

## 2025-04-16 VITALS
WEIGHT: 293 LBS | SYSTOLIC BLOOD PRESSURE: 110 MMHG | HEIGHT: 66 IN | DIASTOLIC BLOOD PRESSURE: 70 MMHG | BODY MASS INDEX: 47.09 KG/M2

## 2025-04-16 DIAGNOSIS — Z09 POSTOP CHECK: Primary | ICD-10-CM

## 2025-04-16 PROCEDURE — 99024 POSTOP FOLLOW-UP VISIT: CPT | Performed by: OBSTETRICS & GYNECOLOGY

## 2025-04-16 NOTE — PROGRESS NOTES
Cydney presents for postop visit from Robotic: Left Ovarian Cystectomy Destruction / Resection of endometriosis Operative Hysteroscopy (D&C) about 2 weeks ago.  Doing well postoperatively..  Pain is controlled, without any bleeding.  Fever: no Voiding well: yes.  Bowel movements OK: yes.   Recommend progesterone contraception to help with endo -- Got nexplanon inserted last week.    Exam: A&OX3, NAD.  Abdomen:  Non tender Incisions clean, dry, intact    A/P.  Stable Post op condition.  Surgery and Path reviewed. Gradually increase activity. Resumption of sexual activity is not recommended at this time.  OK to RTW.

## 2025-05-21 ENCOUNTER — OFFICE VISIT (OUTPATIENT)
Dept: OBGYN CLINIC | Age: 35
End: 2025-05-21
Payer: COMMERCIAL

## 2025-05-21 VITALS — DIASTOLIC BLOOD PRESSURE: 80 MMHG | WEIGHT: 293 LBS | SYSTOLIC BLOOD PRESSURE: 130 MMHG | BODY MASS INDEX: 47.94 KG/M2

## 2025-05-21 DIAGNOSIS — N93.9 ABNORMAL UTERINE BLEEDING (AUB): Primary | ICD-10-CM

## 2025-05-21 PROCEDURE — 99213 OFFICE O/P EST LOW 20 MIN: CPT | Performed by: OBSTETRICS & GYNECOLOGY

## 2025-05-21 PROCEDURE — 99459 PELVIC EXAMINATION: CPT | Performed by: OBSTETRICS & GYNECOLOGY

## 2025-05-21 RX ORDER — ETONOGESTREL 68 MG/1
68 IMPLANT SUBCUTANEOUS ONCE
COMMUNITY

## 2025-05-21 ASSESSMENT — ENCOUNTER SYMPTOMS
RESPIRATORY NEGATIVE: 1
ALLERGIC/IMMUNOLOGIC NEGATIVE: 1
EYES NEGATIVE: 1
GASTROINTESTINAL NEGATIVE: 1

## 2025-05-27 DIAGNOSIS — R79.89 OTHER SPECIFIED ABNORMAL FINDINGS OF BLOOD CHEMISTRY: ICD-10-CM

## 2025-05-27 DIAGNOSIS — R53.83 MALAISE AND FATIGUE: ICD-10-CM

## 2025-05-27 DIAGNOSIS — Z13.228 SCREENING FOR METABOLIC DISORDER: ICD-10-CM

## 2025-05-27 DIAGNOSIS — Z13.29 SCREENING FOR ENDOCRINE, NUTRITIONAL, METABOLIC AND IMMUNITY DISORDER: ICD-10-CM

## 2025-05-27 DIAGNOSIS — Z13.228 SCREENING FOR ENDOCRINE, NUTRITIONAL, METABOLIC AND IMMUNITY DISORDER: ICD-10-CM

## 2025-05-27 DIAGNOSIS — R79.9 ABNORMAL BLOOD CHEMISTRY: ICD-10-CM

## 2025-05-27 DIAGNOSIS — Z13.0 SCREENING FOR DEFICIENCY ANEMIA: ICD-10-CM

## 2025-05-27 DIAGNOSIS — Z79.899 ENCOUNTER FOR LONG-TERM (CURRENT) USE OF MEDICATIONS: ICD-10-CM

## 2025-05-27 DIAGNOSIS — Z13.21 SCREENING FOR ENDOCRINE, NUTRITIONAL, METABOLIC AND IMMUNITY DISORDER: ICD-10-CM

## 2025-05-27 DIAGNOSIS — R53.83 OTHER FATIGUE: ICD-10-CM

## 2025-05-27 DIAGNOSIS — R53.81 MALAISE AND FATIGUE: ICD-10-CM

## 2025-05-27 DIAGNOSIS — Z13.29 SCREENING FOR THYROID DISORDER: ICD-10-CM

## 2025-05-27 DIAGNOSIS — Z13.220 SCREENING FOR LIPID DISORDERS: ICD-10-CM

## 2025-05-27 DIAGNOSIS — Z13.0 SCREENING FOR ENDOCRINE, NUTRITIONAL, METABOLIC AND IMMUNITY DISORDER: ICD-10-CM

## 2025-05-27 LAB
ALBUMIN SERPL-MCNC: 3.5 G/DL (ref 3.5–5)
ALBUMIN/GLOB SERPL: 0.9 (ref 1–1.9)
ALP SERPL-CCNC: 77 U/L (ref 35–104)
ALT SERPL-CCNC: 15 U/L (ref 8–45)
ANION GAP SERPL CALC-SCNC: 10 MMOL/L (ref 7–16)
AST SERPL-CCNC: 19 U/L (ref 15–37)
BASOPHILS # BLD: 0.07 K/UL (ref 0–0.2)
BASOPHILS NFR BLD: 0.6 % (ref 0–2)
BILIRUB SERPL-MCNC: 0.2 MG/DL (ref 0–1.2)
BUN SERPL-MCNC: 15 MG/DL (ref 6–23)
CALCIUM SERPL-MCNC: 10.8 MG/DL (ref 8.8–10.2)
CHLORIDE SERPL-SCNC: 107 MMOL/L (ref 98–107)
CHOLEST SERPL-MCNC: 150 MG/DL (ref 0–200)
CO2 SERPL-SCNC: 22 MMOL/L (ref 20–29)
CREAT SERPL-MCNC: 0.77 MG/DL (ref 0.6–1.1)
DIFFERENTIAL METHOD BLD: ABNORMAL
EOSINOPHIL # BLD: 0.42 K/UL (ref 0–0.8)
EOSINOPHIL NFR BLD: 3.7 % (ref 0.5–7.8)
ERYTHROCYTE [DISTWIDTH] IN BLOOD BY AUTOMATED COUNT: 15.9 % (ref 11.9–14.6)
GLOBULIN SER CALC-MCNC: 3.8 G/DL (ref 2.3–3.5)
GLUCOSE SERPL-MCNC: 83 MG/DL (ref 70–99)
HCT VFR BLD AUTO: 36.9 % (ref 35.8–46.3)
HDLC SERPL-MCNC: 33 MG/DL (ref 40–60)
HDLC SERPL: 4.5 (ref 0–5)
HGB BLD-MCNC: 11.8 G/DL (ref 11.7–15.4)
IMM GRANULOCYTES # BLD AUTO: 0.03 K/UL (ref 0–0.5)
IMM GRANULOCYTES NFR BLD AUTO: 0.3 % (ref 0–5)
LDLC SERPL CALC-MCNC: 99 MG/DL (ref 0–100)
LYMPHOCYTES # BLD: 5.34 K/UL (ref 0.5–4.6)
LYMPHOCYTES NFR BLD: 46.5 % (ref 13–44)
MCH RBC QN AUTO: 27.1 PG (ref 26.1–32.9)
MCHC RBC AUTO-ENTMCNC: 32 G/DL (ref 31.4–35)
MCV RBC AUTO: 84.8 FL (ref 82–102)
MONOCYTES # BLD: 0.89 K/UL (ref 0.1–1.3)
MONOCYTES NFR BLD: 7.8 % (ref 4–12)
NEUTS SEG # BLD: 4.73 K/UL (ref 1.7–8.2)
NEUTS SEG NFR BLD: 41.1 % (ref 43–78)
NRBC # BLD: 0 K/UL (ref 0–0.2)
PLATELET # BLD AUTO: 337 K/UL (ref 150–450)
PMV BLD AUTO: 11.3 FL (ref 9.4–12.3)
POTASSIUM SERPL-SCNC: 4 MMOL/L (ref 3.5–5.1)
PROT SERPL-MCNC: 7.4 G/DL (ref 6.3–8.2)
RBC # BLD AUTO: 4.35 M/UL (ref 4.05–5.2)
SODIUM SERPL-SCNC: 139 MMOL/L (ref 136–145)
TRIGL SERPL-MCNC: 91 MG/DL (ref 0–150)
TSH W FREE THYROID IF ABNORMAL: 1.56 UIU/ML (ref 0.27–4.2)
VLDLC SERPL CALC-MCNC: 18 MG/DL (ref 6–23)
WBC # BLD AUTO: 11.5 K/UL (ref 4.3–11.1)

## 2025-05-27 ASSESSMENT — PATIENT HEALTH QUESTIONNAIRE - PHQ9
SUM OF ALL RESPONSES TO PHQ QUESTIONS 1-9: 0
1. LITTLE INTEREST OR PLEASURE IN DOING THINGS: NOT AT ALL
SUM OF ALL RESPONSES TO PHQ QUESTIONS 1-9: 0
2. FEELING DOWN, DEPRESSED OR HOPELESS: NOT AT ALL
SUM OF ALL RESPONSES TO PHQ QUESTIONS 1-9: 0
SUM OF ALL RESPONSES TO PHQ QUESTIONS 1-9: 0
SUM OF ALL RESPONSES TO PHQ9 QUESTIONS 1 & 2: 0
2. FEELING DOWN, DEPRESSED OR HOPELESS: NOT AT ALL
1. LITTLE INTEREST OR PLEASURE IN DOING THINGS: NOT AT ALL

## 2025-05-29 ENCOUNTER — RESULTS FOLLOW-UP (OUTPATIENT)
Dept: PRIMARY CARE CLINIC | Facility: CLINIC | Age: 35
End: 2025-05-29

## 2025-06-03 ENCOUNTER — OFFICE VISIT (OUTPATIENT)
Dept: PRIMARY CARE CLINIC | Facility: CLINIC | Age: 35
End: 2025-06-03
Payer: COMMERCIAL

## 2025-06-03 VITALS
BODY MASS INDEX: 47.09 KG/M2 | TEMPERATURE: 97.5 F | SYSTOLIC BLOOD PRESSURE: 143 MMHG | DIASTOLIC BLOOD PRESSURE: 99 MMHG | HEART RATE: 87 BPM | WEIGHT: 293 LBS | HEIGHT: 66 IN | OXYGEN SATURATION: 99 %

## 2025-06-03 DIAGNOSIS — E83.52 HYPERCALCEMIA: ICD-10-CM

## 2025-06-03 DIAGNOSIS — E55.9 VITAMIN D DEFICIENCY: ICD-10-CM

## 2025-06-03 DIAGNOSIS — G89.29 CHRONIC BACK PAIN, UNSPECIFIED BACK LOCATION, UNSPECIFIED BACK PAIN LATERALITY: ICD-10-CM

## 2025-06-03 DIAGNOSIS — E83.52 HYPERCALCEMIA: Primary | ICD-10-CM

## 2025-06-03 DIAGNOSIS — M54.9 CHRONIC BACK PAIN, UNSPECIFIED BACK LOCATION, UNSPECIFIED BACK PAIN LATERALITY: ICD-10-CM

## 2025-06-03 DIAGNOSIS — E66.01 OBESITY, MORBID (HCC): ICD-10-CM

## 2025-06-03 LAB — 25(OH)D3 SERPL-MCNC: 43.4 NG/ML (ref 30–100)

## 2025-06-03 PROCEDURE — 99213 OFFICE O/P EST LOW 20 MIN: CPT | Performed by: FAMILY MEDICINE

## 2025-06-03 NOTE — PROGRESS NOTES
[facility] PHYSICIAN SERVICES  [facility] PRIMARY CARE  [address] [place] [address] [address]OU [address]U [id number]  Dept: [phone]       Patient: Cyndey Chester  YOB: 1990  Patient Age: 35 y.o.  Patient Sex: female  Medical Record: [id number]  Visit Date: 06/03/2025    Family Practice Clinic Note    Chief Complaint   Patient presents with    Discuss Labs     Patient to discuss labs    Other     Patient recent D&C and cyst removal showing endometriosis 04/02/2025        History of Present Illness  The patient presents for evaluation of elevated blood pressure and hypercalcemia.    She does not possess a home blood pressure monitor and has no prior history of hypertension. During this visit, her blood pressure was noted to be higher than usual.    In 04/2025, she underwent a surgical procedure for the removal of cysts, which necessitated uterine scraping. She reports no recent falls or fractures.     Her current medication regimen includes a Nexplanon implant and meloxicam, which she resumed after returning to work post-surgery.    PAST SURGICAL HISTORY:  - Surgical removal of cysts with uterine scraping in 04/2025    FAMILY HISTORY  She does not have a family history of hyperparathyroidism that she is aware of.    No Known Allergies    Current Outpatient Medications   Medication Sig Dispense Refill    etonogestrel (NEXPLANON) 68 MG implant 68 mg by Subdermal route once      oxyCODONE 5 MG capsule Take 1 capsule by mouth every 4 hours as needed for Pain. Max Daily Amount: 30 mg (Patient not taking: Reported on 6/3/2025)      ibuprofen (ADVIL;MOTRIN) 800 MG tablet Take 1 tablet by mouth every 8 hours as needed for Pain (Patient not taking: Reported on 6/3/2025) 60 tablet 0    ondansetron (ZOFRAN-ODT) 8 MG TBDP disintegrating tablet Take 1 tablet by mouth every 8 hours as needed for Nausea or Vomiting (Patient not taking: Reported on 6/3/2025) 12 tablet 0    [Paused] meloxicam (MOBIC) 15 MG tablet

## 2025-06-05 LAB
CALCIUM SERPL-MCNC: 10.8 MG/DL (ref 8.7–10.2)
PTH-INTACT SERPL-MCNC: ABNORMAL PG/ML (ref 15–65)

## 2025-06-17 ENCOUNTER — CLINICAL SUPPORT (OUTPATIENT)
Dept: PRIMARY CARE CLINIC | Facility: CLINIC | Age: 35
End: 2025-06-17

## 2025-06-17 VITALS — DIASTOLIC BLOOD PRESSURE: 87 MMHG | SYSTOLIC BLOOD PRESSURE: 125 MMHG

## 2025-08-13 ENCOUNTER — LAB (OUTPATIENT)
Dept: PRIMARY CARE CLINIC | Facility: CLINIC | Age: 35
End: 2025-08-13

## 2025-08-13 DIAGNOSIS — Z79.899 ENCOUNTER FOR LONG-TERM (CURRENT) USE OF MEDICATIONS: ICD-10-CM

## 2025-08-13 DIAGNOSIS — E83.52 HYPERCALCEMIA: ICD-10-CM

## 2025-08-13 DIAGNOSIS — Z11.59 NEED FOR HEPATITIS C SCREENING TEST: ICD-10-CM

## 2025-08-13 DIAGNOSIS — Z13.0 SCREENING FOR DEFICIENCY ANEMIA: ICD-10-CM

## 2025-08-13 DIAGNOSIS — Z13.29 SCREENING FOR ENDOCRINE, NUTRITIONAL, METABOLIC AND IMMUNITY DISORDER: ICD-10-CM

## 2025-08-13 DIAGNOSIS — Z13.0 SCREENING FOR ENDOCRINE, NUTRITIONAL, METABOLIC AND IMMUNITY DISORDER: ICD-10-CM

## 2025-08-13 DIAGNOSIS — Z13.220 SCREENING FOR LIPID DISORDERS: ICD-10-CM

## 2025-08-13 DIAGNOSIS — Z13.228 SCREENING FOR ENDOCRINE, NUTRITIONAL, METABOLIC AND IMMUNITY DISORDER: ICD-10-CM

## 2025-08-13 DIAGNOSIS — Z13.228 SCREENING FOR METABOLIC DISORDER: ICD-10-CM

## 2025-08-13 DIAGNOSIS — R79.9 ABNORMAL BLOOD CHEMISTRY: Primary | ICD-10-CM

## 2025-08-13 DIAGNOSIS — R79.89 OTHER SPECIFIED ABNORMAL FINDINGS OF BLOOD CHEMISTRY: ICD-10-CM

## 2025-08-13 DIAGNOSIS — Z13.21 SCREENING FOR ENDOCRINE, NUTRITIONAL, METABOLIC AND IMMUNITY DISORDER: ICD-10-CM

## 2025-08-13 DIAGNOSIS — E55.9 VITAMIN D DEFICIENCY: ICD-10-CM

## 2025-08-13 LAB
25(OH)D3 SERPL-MCNC: 41.1 NG/ML (ref 30–100)
ALBUMIN SERPL-MCNC: 3.8 G/DL (ref 3.5–5)
ALBUMIN/GLOB SERPL: 1.1 (ref 1–1.9)
ALP SERPL-CCNC: 67 U/L (ref 35–104)
ALT SERPL-CCNC: 11 U/L (ref 8–45)
ANION GAP SERPL CALC-SCNC: 11 MMOL/L (ref 7–16)
AST SERPL-CCNC: 12 U/L (ref 15–37)
BASOPHILS # BLD: 0.05 K/UL (ref 0–0.2)
BASOPHILS NFR BLD: 0.6 % (ref 0–2)
BILIRUB SERPL-MCNC: 0.4 MG/DL (ref 0–1.2)
BUN SERPL-MCNC: 12 MG/DL (ref 6–23)
CA-I BLD-MCNC: 5.67 MG/DL (ref 4–5.2)
CALCIUM SERPL-MCNC: 11 MG/DL (ref 8.8–10.2)
CHLORIDE SERPL-SCNC: 106 MMOL/L (ref 98–107)
CHOLEST SERPL-MCNC: 167 MG/DL (ref 0–200)
CO2 SERPL-SCNC: 23 MMOL/L (ref 20–29)
CREAT SERPL-MCNC: 0.71 MG/DL (ref 0.6–1.1)
DIFFERENTIAL METHOD BLD: ABNORMAL
EOSINOPHIL # BLD: 0.31 K/UL (ref 0–0.8)
EOSINOPHIL NFR BLD: 3.6 % (ref 0.5–7.8)
ERYTHROCYTE [DISTWIDTH] IN BLOOD BY AUTOMATED COUNT: 15.1 % (ref 11.9–14.6)
GLOBULIN SER CALC-MCNC: 3.6 G/DL (ref 2.3–3.5)
GLUCOSE SERPL-MCNC: 90 MG/DL (ref 70–99)
HCT VFR BLD AUTO: 40.3 % (ref 35.8–46.3)
HCV AB SER QL: NONREACTIVE
HDLC SERPL-MCNC: 37 MG/DL (ref 40–60)
HDLC SERPL: 4.6 (ref 0–5)
HGB BLD-MCNC: 12.5 G/DL (ref 11.7–15.4)
IMM GRANULOCYTES # BLD AUTO: 0.03 K/UL (ref 0–0.5)
IMM GRANULOCYTES NFR BLD AUTO: 0.3 % (ref 0–5)
LDLC SERPL CALC-MCNC: 108 MG/DL (ref 0–100)
LYMPHOCYTES # BLD: 2.95 K/UL (ref 0.5–4.6)
LYMPHOCYTES NFR BLD: 34 % (ref 13–44)
MCH RBC QN AUTO: 27.5 PG (ref 26.1–32.9)
MCHC RBC AUTO-ENTMCNC: 31 G/DL (ref 31.4–35)
MCV RBC AUTO: 88.8 FL (ref 82–102)
MONOCYTES # BLD: 0.93 K/UL (ref 0.1–1.3)
MONOCYTES NFR BLD: 10.7 % (ref 4–12)
NEUTS SEG # BLD: 4.41 K/UL (ref 1.7–8.2)
NEUTS SEG NFR BLD: 50.8 % (ref 43–78)
NRBC # BLD: 0 K/UL (ref 0–0.2)
PLATELET # BLD AUTO: 328 K/UL (ref 150–450)
PMV BLD AUTO: 11 FL (ref 9.4–12.3)
POTASSIUM SERPL-SCNC: 4.3 MMOL/L (ref 3.5–5.1)
PROT SERPL-MCNC: 7.4 G/DL (ref 6.3–8.2)
RBC # BLD AUTO: 4.54 M/UL (ref 4.05–5.2)
SODIUM SERPL-SCNC: 139 MMOL/L (ref 136–145)
TRIGL SERPL-MCNC: 112 MG/DL (ref 0–150)
VLDLC SERPL CALC-MCNC: 22 MG/DL (ref 6–23)
WBC # BLD AUTO: 8.7 K/UL (ref 4.3–11.1)

## 2025-08-21 LAB — PTH RELATED PROT SERPL-SCNC: <2 PMOL/L

## 2025-09-02 ENCOUNTER — PATIENT MESSAGE (OUTPATIENT)
Dept: PRIMARY CARE CLINIC | Facility: CLINIC | Age: 35
End: 2025-09-02

## 2025-09-02 DIAGNOSIS — G89.29 CHRONIC PAIN OF RIGHT KNEE: ICD-10-CM

## 2025-09-02 DIAGNOSIS — M25.561 CHRONIC PAIN OF RIGHT KNEE: ICD-10-CM

## 2025-09-02 DIAGNOSIS — M62.830 BACK SPASM: ICD-10-CM

## 2025-09-02 RX ORDER — MELOXICAM 15 MG/1
15 TABLET ORAL DAILY PRN
Qty: 30 TABLET | Refills: 2 | Status: SHIPPED | OUTPATIENT
Start: 2025-09-02

## (undated) DEVICE — GAUZE,SPONGE,2"X2",8PLY,STERILE,LF,2'S: Brand: MEDLINE

## (undated) DEVICE — BURR: Brand: MICA

## (undated) DEVICE — CONTROL SYRINGE LUER-LOCK TIP: Brand: MONOJECT

## (undated) DEVICE — GLOVE SURG SZ 75 CRM LTX FREE POLYISOPRENE POLYMER BEAD ANTI

## (undated) DEVICE — CARDINAL HEALTH FLEXIBLE LIGHT HANDLE COVER: Brand: CARDINAL HEALTH

## (undated) DEVICE — PAD PT POS 36 IN SURGYPAD DISP

## (undated) DEVICE — BARRIER ADH W3XL4IN UTER PELV ABSRB GYNECARE INTCEED

## (undated) DEVICE — SUTURE MCRYL SZ 3-0 L27IN ABSRB UD L19MM PS-2 3/8 CIR PRIM Y427H

## (undated) DEVICE — BLADELESS OBTURATOR: Brand: WECK VISTA

## (undated) DEVICE — HEX DRIVER: Brand: MICA

## (undated) DEVICE — DRILL BIT: Brand: MICA

## (undated) DEVICE — SOLUTION IRRIG 3000ML 0.9% SOD CHL USP UROMATIC PLAS CONT

## (undated) DEVICE — GLOVE SURG SZ 7 L12IN FNGR THK79MIL GRN LTX FREE

## (undated) DEVICE — BUTTON SWITCH PENCIL BLADE ELECTRODE, HOLSTER: Brand: EDGE

## (undated) DEVICE — KNEE ARTHROSCOPY DR KOCH: Brand: MEDLINE INDUSTRIES, INC.

## (undated) DEVICE — COLUMN DRAPE

## (undated) DEVICE — PRECISION THIN, OFFSET (5.5 X 0.38 X 25.0MM)

## (undated) DEVICE — MASTISOL ADHESIVE LIQ 2/3ML

## (undated) DEVICE — MINOR LITHOTOMY PACK: Brand: MEDLINE INDUSTRIES, INC.

## (undated) DEVICE — APPLICATOR MEDICATED 26 CC SOLUTION HI LT ORNG CHLORAPREP

## (undated) DEVICE — SOLUTION IRRIG 1000ML STRL H2O USP PLAS POUR BTL

## (undated) DEVICE — CANISTER, RIGID, 2000CC: Brand: MEDLINE INDUSTRIES, INC.

## (undated) DEVICE — SOLUTION IV 1000ML 0.9% SOD CHL

## (undated) DEVICE — REM POLYHESIVE ADULT PATIENT RETURN ELECTRODE: Brand: VALLEYLAB

## (undated) DEVICE — SYRINGE MED 30ML STD CLR PLAS LUERLOCK TIP N CTRL DISP

## (undated) DEVICE — BLADE SHV L13CM DIA4MM CVD DISECT AGG COOLCUT

## (undated) DEVICE — ENDOSCOPIC ELECTROSURGICAL(5)

## (undated) DEVICE — CATHETER URETH 16FR BLLN 5CC SIL ALLY W/ SIL HYDRGEL 2 W F

## (undated) DEVICE — SEAL

## (undated) DEVICE — INTENDED FOR TISSUE SEPARATION, AND OTHER PROCEDURES THAT REQUIRE A SHARP SURGICAL BLADE TO PUNCTURE OR CUT.: Brand: BARD-PARKER ® STAINLESS STEEL BLADES

## (undated) DEVICE — K-WIRE BLUNT/TROCAR
Type: IMPLANTABLE DEVICE | Site: FOOT | Status: NON-FUNCTIONAL
Removed: 2020-06-11

## (undated) DEVICE — SUTURE MONOCRYL SZ 4-0 L27IN ABSRB UD L19MM PS-2 1/2 CIR PRIM Y426H

## (undated) DEVICE — ARM DRAPE

## (undated) DEVICE — ZIMMER® STERILE DISPOSABLE TOURNIQUET CUFF WITH PLC, DUAL PORT, SINGLE BLADDER, 34 IN. (86 CM)

## (undated) DEVICE — BNDG ELAS COBAN 2INX5YD NS --

## (undated) DEVICE — SUT ETHLN 3-0 18IN PS1 BLK --

## (undated) DEVICE — DRAPE C ARM W54XL84IN MINI FOR OEC 6800

## (undated) DEVICE — ROBOTIC DRAPE WITH LEGGINGS: Brand: CONVERTORS

## (undated) DEVICE — FOOT & ANKLE SOFT DR WOMACK: Brand: MEDLINE INDUSTRIES, INC.

## (undated) DEVICE — GLOVE ORANGE PI 7   MSG9070

## (undated) DEVICE — SOLUTION IV 1000ML LAC RINGERS PH 6.5 INJ USP VIAFLX PLAS

## (undated) DEVICE — PRECISION THIN (9.0 X 0.38 X 31.0MM)

## (undated) DEVICE — AIRSEAL 8 MM ACCESS PORT AND LOW PROFILE OBTURATOR WITH BLADELESS OPTICAL TIP, 120 MM LENGTH: Brand: AIRSEAL

## (undated) DEVICE — BNDG ELAS ESMARK 4INX12FT LF -- STRL

## (undated) DEVICE — AMD ANTIMICROBIAL GAUZE SPONGES,12 PLY USP TYPE VII, 0.2% POLYHEXAMETHYLENE BIGUANIDE HCI (PHMB): Brand: CURITY

## (undated) DEVICE — DRAPE,UNDERBUTTOCKS,PCH,STERILE: Brand: MEDLINE

## (undated) DEVICE — SINGLE PORT MANIFOLD: Brand: NEPTUNE 2

## (undated) DEVICE — Device

## (undated) DEVICE — ZIMMER® A.T.S. CYCLINDRICAL TOURNIQUET CUFF, SINGLE PORT, SINGLE BLADDER 34 IN. 76 CM)

## (undated) DEVICE — TIP COVER ACCESSORY

## (undated) DEVICE — BANDAGE,GAUZE,CONFORMING,2"X75",STRL,LF: Brand: MEDLINE INDUSTRIES, INC.

## (undated) DEVICE — HANDPIECE SET WITH COAXIAL HIGH FLOW TIP AND SUCTION TUBE: Brand: INTERPULSE

## (undated) DEVICE — MANIPULATOR UTER INSTRUMENT ZUMI

## (undated) DEVICE — PADDING CAST W2INXL4YD ST COT COHESIVE HND TEARABLE SPEC

## (undated) DEVICE — INJECTOR UTERINE MANIPULATOR

## (undated) DEVICE — (D)PREP SKN CHLRAPRP APPL 26ML -- CONVERT TO ITEM 371833

## (undated) DEVICE — 3M™ TEGADERM™ TRANSPARENT FILM DRESSING FRAME STYLE, 1624W, 2-3/8 IN X 2-3/4 IN (6 CM X 7 CM), 100/CT 4CT/CASE: Brand: 3M™ TEGADERM™

## (undated) DEVICE — DRSG GZ OIL EMUL CURAD 3X8 --

## (undated) DEVICE — SYRINGE MED 10ML LUERLOCK TIP W/O SFTY DISP

## (undated) DEVICE — ROBOTIC HYSTERECTOMY: Brand: MEDLINE INDUSTRIES, INC.